# Patient Record
Sex: MALE | Race: WHITE | NOT HISPANIC OR LATINO | Employment: OTHER | URBAN - NONMETROPOLITAN AREA
[De-identification: names, ages, dates, MRNs, and addresses within clinical notes are randomized per-mention and may not be internally consistent; named-entity substitution may affect disease eponyms.]

---

## 2017-03-22 DIAGNOSIS — M25.512 LEFT SHOULDER PAIN: Primary | ICD-10-CM

## 2017-03-27 ENCOUNTER — HOSPITAL ENCOUNTER (OUTPATIENT)
Dept: MRI IMAGING | Facility: HOSPITAL | Age: 77
Discharge: HOME | End: 2017-03-27
Attending: FAMILY MEDICINE | Admitting: FAMILY MEDICINE
Payer: MEDICARE

## 2017-03-27 PROCEDURE — 73221 MRI JOINT UPR EXTREM W/O DYE: CPT | Mod: TC,LT

## 2017-11-09 ENCOUNTER — APPOINTMENT (OUTPATIENT)
Dept: ANESTHESIOLOGY | Facility: HOSPITAL | Age: 77
End: 2017-11-09
Attending: OPHTHALMOLOGY
Payer: MEDICARE

## 2017-11-09 PROCEDURE — 99100 ANES PT EXTEME AGE<1 YR&>70: CPT | Performed by: NURSE ANESTHETIST, CERTIFIED REGISTERED

## 2017-11-09 PROCEDURE — 00142 ANES PX ON EYE LENS SURGERY: CPT | Mod: QZ | Performed by: NURSE ANESTHETIST, CERTIFIED REGISTERED

## 2017-11-21 ENCOUNTER — APPOINTMENT (OUTPATIENT)
Dept: ANESTHESIOLOGY | Facility: HOSPITAL | Age: 77
End: 2017-11-21
Attending: OPHTHALMOLOGY
Payer: MEDICARE

## 2017-11-21 PROCEDURE — 99100 ANES PT EXTEME AGE<1 YR&>70: CPT | Performed by: NURSE ANESTHETIST, CERTIFIED REGISTERED

## 2017-11-21 PROCEDURE — 00142 ANES PX ON EYE LENS SURGERY: CPT | Mod: QZ | Performed by: NURSE ANESTHETIST, CERTIFIED REGISTERED

## 2018-12-20 ENCOUNTER — HOSPITAL ENCOUNTER (EMERGENCY)
Facility: HOSPITAL | Age: 78
Discharge: HOME | End: 2018-12-20
Attending: EMERGENCY MEDICINE | Admitting: EMERGENCY MEDICINE
Payer: MEDICARE

## 2018-12-20 VITALS
RESPIRATION RATE: 16 BRPM | WEIGHT: 225 LBS | HEIGHT: 73 IN | TEMPERATURE: 97.9 F | BODY MASS INDEX: 29.82 KG/M2 | DIASTOLIC BLOOD PRESSURE: 93 MMHG | HEART RATE: 86 BPM | OXYGEN SATURATION: 95 % | SYSTOLIC BLOOD PRESSURE: 159 MMHG

## 2018-12-20 DIAGNOSIS — H66.002 LEFT ACUTE SUPPURATIVE OTITIS MEDIA: Primary | ICD-10-CM

## 2018-12-20 PROCEDURE — 25000132 ZZH RX MED GY IP 250 OP 250 PS 637: Mod: GY | Performed by: EMERGENCY MEDICINE

## 2018-12-20 PROCEDURE — 99283 EMERGENCY DEPT VISIT LOW MDM: CPT | Mod: Z6 | Performed by: EMERGENCY MEDICINE

## 2018-12-20 PROCEDURE — A9270 NON-COVERED ITEM OR SERVICE: HCPCS | Mod: GY | Performed by: EMERGENCY MEDICINE

## 2018-12-20 PROCEDURE — 99283 EMERGENCY DEPT VISIT LOW MDM: CPT

## 2018-12-20 RX ORDER — IBUPROFEN 800 MG/1
800 TABLET, FILM COATED ORAL ONCE
Status: COMPLETED | OUTPATIENT
Start: 2018-12-20 | End: 2018-12-20

## 2018-12-20 RX ORDER — METOPROLOL SUCCINATE 50 MG/1
50 TABLET, EXTENDED RELEASE ORAL
COMMUNITY
End: 2023-01-27

## 2018-12-20 RX ORDER — IRBESARTAN AND HYDROCHLOROTHIAZIDE 300; 12.5 MG/1; MG/1
1 TABLET, FILM COATED ORAL
COMMUNITY
End: 2023-02-03

## 2018-12-20 RX ORDER — IBUPROFEN 800 MG/1
800 TABLET, FILM COATED ORAL EVERY 8 HOURS PRN
Qty: 60 TABLET | Refills: 0 | Status: SHIPPED | OUTPATIENT
Start: 2018-12-20 | End: 2018-12-28

## 2018-12-20 RX ORDER — TRAMADOL HYDROCHLORIDE 50 MG/1
50-100 TABLET ORAL
COMMUNITY
Start: 2016-09-19 | End: 2018-12-20

## 2018-12-20 RX ORDER — EZETIMIBE 10 MG/1
10 TABLET ORAL EVERY MORNING
COMMUNITY

## 2018-12-20 RX ORDER — ASPIRIN 81 MG/1
81 TABLET ORAL EVERY MORNING
COMMUNITY

## 2018-12-20 RX ORDER — GABAPENTIN 100 MG/1
100 CAPSULE ORAL AT BEDTIME
COMMUNITY
End: 2023-02-03

## 2018-12-20 RX ORDER — AMLODIPINE BESYLATE 5 MG/1
5 TABLET ORAL
COMMUNITY
End: 2023-01-27

## 2018-12-20 RX ORDER — DUTASTERIDE 0.5 MG/1
0.5 CAPSULE, LIQUID FILLED ORAL EVERY MORNING
COMMUNITY

## 2018-12-20 RX ORDER — METHOCARBAMOL 750 MG/1
750 TABLET, FILM COATED ORAL
COMMUNITY
Start: 2015-12-11 | End: 2018-12-20

## 2018-12-20 RX ORDER — SIMVASTATIN 20 MG
20 TABLET ORAL
COMMUNITY
Start: 2011-03-23

## 2018-12-20 RX ADMIN — IBUPROFEN 800 MG: 800 TABLET ORAL at 03:58

## 2018-12-20 RX ADMIN — AMOXICILLIN AND CLAVULANATE POTASSIUM 1 TABLET: 875; 125 TABLET, FILM COATED ORAL at 03:58

## 2018-12-20 SDOH — HEALTH STABILITY: MENTAL HEALTH: HOW OFTEN DO YOU HAVE A DRINK CONTAINING ALCOHOL?: NEVER

## 2018-12-20 ASSESSMENT — ENCOUNTER SYMPTOMS
FEVER: 0
ABDOMINAL PAIN: 0
SHORTNESS OF BREATH: 0

## 2018-12-20 ASSESSMENT — MIFFLIN-ST. JEOR: SCORE: 1789.47

## 2018-12-20 NOTE — ED AVS SNAPSHOT
[facility]  [address] [address]  Phone:  [phone]                                    Ramón Salazar   MRN: [id number]    Department:  [facility]   Date of Visit:  12/20/2018           After Visit Summary Signature Page    I have received my discharge instructions, and my questions have been answered. I have discussed any challenges I see with this plan with the nurse or doctor.    ..........................................................................................................................................  Patient/Patient Representative Signature      ..........................................................................................................................................  Patient Representative Print Name and Relationship to Patient    ..................................................               ................................................  Date                                   Time    ..........................................................................................................................................  Reviewed by Signature/Title    ...................................................              ..............................................  Date                                               Time          22EPIC Rev 08/18

## 2018-12-20 NOTE — ED PROVIDER NOTES
"  History     Chief Complaint   Patient presents with     URI     Otalgia     Pharyngitis     HPI  Ramón Salazar is a 78 year old male who presents to the emergency department with a 2-day history of sore throat, left ear pain, nasal congestion.  He started having discharge from the left ear since last night.  The pain has become more intense tonight.  Reports to having recurrent problems with the left ear.    Problem List:    There are no active problems to display for this patient.       Past Medical History:    History reviewed. No pertinent past medical history.    Past Surgical History:    History reviewed. No pertinent surgical history.    Family History:    History reviewed. No pertinent family history.    Social History:  Marital Status:   [2]  Social History     Tobacco Use     Smoking status: Never Smoker     Smokeless tobacco: Former User   Substance Use Topics     Alcohol use: No     Frequency: Never     Drug use: No        Medications:      amLODIPine (NORVASC) 5 MG tablet   amoxicillin-clavulanate (AUGMENTIN) 875-125 MG tablet   aspirin 81 MG EC tablet   dutasteride (AVODART) 0.5 MG capsule   ezetimibe (ZETIA) 10 MG tablet   gabapentin (NEURONTIN) 100 MG capsule   Garlic 1000 MG CAPS   ibuprofen (ADVIL/MOTRIN) 800 MG tablet   irbesartan-hydrochlorothiazide (AVALIDE) 300-12.5 MG tablet   MAGNESIUM CITRATE PO   metFORMIN (GLUCOPHAGE) 1000 MG tablet   metoprolol succinate ER (TOPROL-XL) 50 MG 24 hr tablet   simvastatin (ZOCOR) 20 MG tablet         Review of Systems   Constitutional: Negative for fever.   HENT: Positive for congestion, ear discharge and ear pain.    Respiratory: Negative for shortness of breath.    Cardiovascular: Negative for chest pain.   Gastrointestinal: Negative for abdominal pain.   All other systems reviewed and are negative.      Physical Exam   BP: 159/93  Pulse: 86  Temp: 97.9  F (36.6  C)  Resp: 16  Height: 185.4 cm (6' 1\")  Weight: 102.1 kg (225 lb)  SpO2: 95 " %      Physical Exam   Constitutional: He is oriented to person, place, and time. He appears well-developed and well-nourished. No distress.   HENT:   Head: Normocephalic and atraumatic.   Mouth/Throat: Oropharynx is clear and moist.   Separative left otitis media.   Eyes: EOM are normal. Pupils are equal, round, and reactive to light.   Cardiovascular: Normal rate, regular rhythm, normal heart sounds and intact distal pulses.   Pulmonary/Chest: Effort normal and breath sounds normal. No stridor. No respiratory distress.   Abdominal: Bowel sounds are normal. He exhibits no distension. There is no tenderness.   Musculoskeletal: He exhibits no edema, tenderness or deformity.   Neurological: He is alert and oriented to person, place, and time. No cranial nerve deficit.   Skin: He is not diaphoretic.   Nursing note and vitals reviewed.      ED Course        Procedures         No results found for this or any previous visit (from the past 24 hour(s)).    Medications   amoxicillin-clavulanate (AUGMENTIN) 875-125 MG per tablet 1 tablet (1 tablet Oral Given 12/20/18 0358)   ibuprofen (ADVIL/MOTRIN) tablet 800 mg (800 mg Oral Given 12/20/18 0358)       Assessments & Plan (with Medical Decision Making)   Left acute suppurative otitis media: Patient started on Augmentin on Motrin and was discharged home on the same.  Advised follow-up with PCP after completion of antibiotics.  Subsequently discharged from the ED.    I have reviewed the nursing notes.    I have reviewed the findings, diagnosis, plan and need for follow up with the patient.       Medication List      Started    amoxicillin-clavulanate 875-125 MG tablet  Commonly known as:  AUGMENTIN  1 tablet, Oral, 2 TIMES DAILY     ibuprofen 800 MG tablet  Commonly known as:  ADVIL/MOTRIN  800 mg, Oral, EVERY 8 HOURS PRN            Final diagnoses:   Left acute suppurative otitis media       12/20/2018   [facility]     Mike Randolph MD  12/20/18 8196        Mike Randolph MD  12/20/18 5255

## 2021-05-18 ENCOUNTER — OFFICE VISIT (OUTPATIENT)
Dept: OTOLARYNGOLOGY | Facility: OTHER | Age: 81
End: 2021-05-18
Attending: OTOLARYNGOLOGY
Payer: MEDICARE

## 2021-05-18 DIAGNOSIS — H90.A32 MIXED CONDUCTIVE AND SENSORINEURAL HEARING LOSS OF LEFT EAR WITH RESTRICTED HEARING OF RIGHT EAR: Primary | ICD-10-CM

## 2021-05-18 PROCEDURE — G0463 HOSPITAL OUTPT CLINIC VISIT: HCPCS

## 2021-05-18 NOTE — PROGRESS NOTES
document embedded image  Patient Name: Ramón Salazar    Address: [address] [place] [address]     YOB: 1940    [place] [address]    MR Number: [id number]    Phone: [phone]  PCP: Dwight Bliss DO            Appointment Date: 05/18/21   Visit Provider: Curry Tamayo MD    cc: Dwight Bliss DO; ~    ENT Progress Note  Visit Reasons: Left ear/decreased Hearing    HPI  History of Present Illness  Chief complaint:  Hearing loss    History  The patient is an 80-year-old man whose formally been a patient of Dr. Guidry.  He has a tube in his left ear.  He has had some intermittent drainage but denies any at present.  He feels his hearing loss has worsened over the years and is dramatically worsened in his left ear.    Exam  The external auditory canal and TM are clear on the right.  On the left there is a tube in place and appears to be some moist drainage present.  Remainder of the head neck exam is unremarkable  Audiogram-sloping sensorineural hearing loss in the right ear.  He has some additional conductive hearing loss in the left.  His speech reception thresholds 30 on the right and 40 on the left.    Home Medications     - Last Reconciled 05/19/21 by Theodore Tubbs    allopurinol 100 mg tablet  100 mg PO DAILY  ascorbic acid (vitamin C) 1,000 mg tablet  1 g PO DAILY  aspirin 81 mg tablet,delayed release (Adult Aspirin Regimen)  81 mg PO DAILY  blood sugar diagnostic (Accu-Chek Lenka Plus test strp)  TEST TWICE DAILY  blood-glucose meter   As directed  cholecalciferol (vitamin D3) 25 mcg (1,000 unit) capsule  25 mcg PO DAILY  dulaglutide 0.75 mg/0.5 mL subcutaneous pen injector (Trulicity)  0.75 mg (0.5 mL) SUBCUT QWEEK  dutasteride 0.5 mg capsule  0.5 mg PO DAILY  echinacea   PO  ezetimibe 10 mg tablet  10 mg PO DAILY  flash glucose scanning reader   As directed  flash glucose sensor   As directed  fluticasone propionate 50 mcg/actuation nasal spray,suspension  2  sprays intranasal DAILY  gabapentin 100 mg capsule  100 mg PO BEDTIME  garlic   1,000 mg PO QPC  glucos sul 2KCl-msm-chond-C-Mn (Glucosamine Chondroitin)  PO  irbesartan 300 mg tablet  300 mg PO DAILY  magnesium 250 mg tablet  250 mg PO BID  metformin 1,000 mg tablet  TAKE 1 TABLET BY MOUTH TWICE DAILY  multivitamin   1 tab PO DAILY  simvastatin 20 mg tablet  20 mg PO BEDTIME  zinc sulfate 50 mg zinc (220 mg) tablet  50 mg PO DAILY    Allergies    morphine Adverse Reaction (Verified 05/19/21 10:58)  Anxiety    PFSH  PFSH:     Medical History (Reviewed 05/19/21 @ 10:58 by Shelli Bear, Med Assist)    Conductive hearing loss of left ear with unrestricted hearing of right ear (05/29/18)  Controlled diabetes mellitus type II without complication (06/25/18)  Controlled type 2 diabetes mellitus  Enlarged prostate without lower urinary tract symptoms (luts) (03/22/16)  Erectile dysfunction (06/25/18)  Essential (primary) hypertension (06/25/18)  Heart stopped beating  Aborted back surgery and needed temporary pacemaker  Mixed hearing loss (05/08/17)  Mixed hyperlipidemia (06/25/18)  Osteoarthritis, knee (03/22/17)  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 small polyp. Recheck in 2021.  History of back surgery  Dec 2011  History of neck surgery  March 2011 spinal stenosis  History of shoulder surgery  June 2003 Right shoulder rotator cuff repair  History of surgery of head  Trauma to forehead, repaired Aug. 1969  Total knee replacement status (03/22/17)     Family History (Reviewed 05/19/21 @ 10:58 by Shelli Bear, Med Assist)  Father  Cancer  Emphysema of lung  Mother  Lung cancer  Sister  Breast cancer  Other  Bone cancer  Diabetes mellitus  Heart disease  Hypertension       Social History (Reviewed 05/19/21 @ 10:58 by Shelli Bear, Med  Assist)  Smoking Status:  Never smoker  second hand exposure:  No (past)  alcohol intake:  current alcohol intake frequency: holidays/special occasions only Alcohol type: beer, wine, hard liquor and other  substance use type:  does not use  marital status:    lives independently:  Yes  number of children:  4  housing:  house  Do you exercise regularly?:  No  caffeine:  Yes Type: coffee Number of servings: 1       A&P  Assessment & Plan  (1) Mixed hearing loss of left ear:        Status: Acute        Code(s):  H90.72 - Mixed conductive and sensorineural hearing loss, unilateral, left ear, with unrestricted hearing on the contralateral side        Qualifiers:          Contralateral hearing status: restricted hearing on contralateral side  Qualified Code(s): H90.A32 - Mixed conductive and sensorineural hearing loss, unilateral, left ear with restricted hearing on the contralateral side  Additional Plan Details  Plan Details: I would not recommend hearing aid fitting on the left ear given his recurring drainage and persistent need for a tube.  I think he is safe to proceed with a hearing aid on the right if he wishes.  He should continue to see me yearly for T-tube checks on the left.  He is welcome to call if he develops drainage in the future.      Curry Tamayo MD    05/18/21 7639    <Electronically signed by Curry Tamayo MD> 05/19/21 8145

## 2022-03-01 ENCOUNTER — MEDICAL CORRESPONDENCE (OUTPATIENT)
Dept: CT IMAGING | Facility: HOSPITAL | Age: 82
End: 2022-03-01
Payer: COMMERCIAL

## 2022-03-22 ENCOUNTER — HOSPITAL ENCOUNTER (OUTPATIENT)
Facility: HOSPITAL | Age: 82
Discharge: HOME | End: 2022-03-22
Attending: RADIOLOGY | Admitting: RADIOLOGY
Payer: MEDICARE

## 2022-03-22 ENCOUNTER — HOSPITAL ENCOUNTER (OUTPATIENT)
Dept: INTERVENTIONAL RADIOLOGY/VASCULAR | Facility: HOSPITAL | Age: 82
Discharge: HOME | End: 2022-03-22
Attending: ORTHOPAEDIC SURGERY | Admitting: RADIOLOGY
Payer: MEDICARE

## 2022-03-22 ENCOUNTER — HOSPITAL ENCOUNTER (OUTPATIENT)
Dept: CT IMAGING | Facility: HOSPITAL | Age: 82
Discharge: HOME | End: 2022-03-22
Attending: ORTHOPAEDIC SURGERY | Admitting: RADIOLOGY
Payer: MEDICARE

## 2022-03-22 DIAGNOSIS — M25.612 DECREASED ROM OF LEFT SHOULDER: ICD-10-CM

## 2022-03-22 DIAGNOSIS — R29.898 ARM WEAKNESS: ICD-10-CM

## 2022-03-22 DIAGNOSIS — M25.512 LEFT SHOULDER PAIN: ICD-10-CM

## 2022-03-22 PROCEDURE — 250N000009 HC RX 250: Performed by: RADIOLOGY

## 2022-03-22 PROCEDURE — 255N000002 HC RX 255 OP 636: Performed by: RADIOLOGY

## 2022-03-22 PROCEDURE — 23350 INJECTION FOR SHOULDER X-RAY: CPT

## 2022-03-22 PROCEDURE — 73201 CT UPPER EXTREMITY W/DYE: CPT | Mod: LT

## 2022-03-22 RX ORDER — LIDOCAINE HYDROCHLORIDE 10 MG/ML
10 INJECTION, SOLUTION EPIDURAL; INFILTRATION; INTRACAUDAL; PERINEURAL ONCE
Status: COMPLETED | OUTPATIENT
Start: 2022-03-22 | End: 2022-03-22

## 2022-03-22 RX ORDER — IOPAMIDOL 612 MG/ML
50 INJECTION, SOLUTION INTRAVASCULAR ONCE
Status: COMPLETED | OUTPATIENT
Start: 2022-03-22 | End: 2022-03-22

## 2022-03-22 RX ADMIN — LIDOCAINE HYDROCHLORIDE 5 ML: 10 INJECTION, SOLUTION EPIDURAL; INFILTRATION; INTRACAUDAL; PERINEURAL at 10:25

## 2022-03-22 RX ADMIN — IOPAMIDOL 8 ML: 612 INJECTION, SOLUTION INTRAVENOUS at 10:26

## 2022-03-23 ENCOUNTER — TELEPHONE (OUTPATIENT)
Dept: MAMMOGRAPHY | Facility: OTHER | Age: 82
End: 2022-03-23

## 2022-12-29 ENCOUNTER — PREP FOR PROCEDURE (OUTPATIENT)
Dept: SURGERY | Facility: CLINIC | Age: 82
End: 2022-12-29

## 2022-12-29 DIAGNOSIS — M75.102 TEAR OF LEFT ROTATOR CUFF, UNSPECIFIED TEAR EXTENT, UNSPECIFIED WHETHER TRAUMATIC: Primary | ICD-10-CM

## 2022-12-29 RX ORDER — TRANEXAMIC ACID 10 MG/ML
1 INJECTION, SOLUTION INTRAVENOUS ONCE
Status: CANCELLED | OUTPATIENT
Start: 2023-02-06 | End: 2022-12-29

## 2023-01-24 ENCOUNTER — ANESTHESIA EVENT (OUTPATIENT)
Dept: SURGERY | Facility: HOSPITAL | Age: 83
DRG: 483 | End: 2023-01-24
Payer: MEDICARE

## 2023-01-27 NOTE — OR NURSING
email sent to total joint replacement team as follows;    We have Ramón Salazar : 38 coming  for Left revision total shoulder replacement conversion to left reverse total shoulder replacement with Dr. Miranda, PCP Dr. Bliss.  Ramón did receive the total joint replacement education packet for shoulder and will perform the two showers with hibiclens soap provided as instructed.  He is right hand dominant and his wife will be helping him.  He is aware of total shoulder replacement recovery.  He plans on going home the following morning from surgery.    Reviewed the following topics with Ramón;  Call don t Fall , Visitor/mask policy, Incentive spirometer, Using ice, Possible constipation with pain medication use, Wound care and signs & symptoms of infection to watch for, report and prevent.  He verbalized good understanding.

## 2023-01-30 ENCOUNTER — TRANSFERRED RECORDS (OUTPATIENT)
Dept: HEALTH INFORMATION MANAGEMENT | Facility: CLINIC | Age: 83
End: 2023-01-30
Payer: COMMERCIAL

## 2023-01-30 LAB
ALT SERPL-CCNC: 23 U/L (ref 18–65)
AST SERPL-CCNC: 22 U/L (ref 10–40)
CREATININE (EXTERNAL): 0.92 MG/DL (ref 0.8–1.5)
GFR ESTIMATED (EXTERNAL): >60 ML/MIN/1.73M2
GLUCOSE (EXTERNAL): 173 MG/DL (ref 60–99)
POTASSIUM (EXTERNAL): 4.3 MMOL/L (ref 3.5–5.1)

## 2023-01-30 RX ORDER — LABETALOL 20 MG/4 ML (5 MG/ML) INTRAVENOUS SYRINGE
10
Status: DISCONTINUED | OUTPATIENT
Start: 2023-01-30 | End: 2023-01-30

## 2023-01-30 ASSESSMENT — ENCOUNTER SYMPTOMS: DYSRHYTHMIAS: 1

## 2023-01-30 NOTE — ANESTHESIA PREPROCEDURE EVALUATION
Anesthesia Pre-Procedure Evaluation    Patient: Ramón Salazar   MRN: [id number] : 1940        Procedure : Procedure(s):  Left Revision Total Shoulder Arthroplasty; Conversion to Reverse Total Shoulder Arthroplasty          No past medical history on file.   History reviewed. No pertinent surgical history.   No Known Allergies   Social History     Tobacco Use     Smoking status: Never     Smokeless tobacco: Former   Substance Use Topics     Alcohol use: No      Wt Readings from Last 1 Encounters:   18 102.1 kg (225 lb)        Anesthesia Evaluation   Pt has had prior anesthetic. Type: General.        ROS/MED HX  ENT/Pulmonary: Comment: Conductive hearing loss      Neurologic: Comment: spinal stenosis    Hx spine surgery: TLIF and T12-L1 laminectomy; had heart block during procedure requiring temporary pacemaker. Previous cervical spine surgery    Degenerative disc disease   Hearing loss  tinnitus    (+) peripheral neuropathy, - numb in fingers.     Cardiovascular: Comment: Erectile dysfunction    Aborted back surgery and needed temporary pacemaker in     Wenckebach block    (+) Dyslipidemia hypertension-----dysrhythmias, 2nd Deg Heart Block, Previous cardiac testing   Echo: Date: 21 Results:  LV normal in size  LV systolic function low normal  EF 50-55  RV systolic fx normal  Borderline ascending aorta dilation    Stress Test: Date: Results:    ECG Reviewed: Date: 23 Results:  SR with IRBBB  Cath: Date: Results:      METS/Exercise Tolerance: >4 METS    Hematologic:  - neg hematologic  ROS     Musculoskeletal: Comment:   S/p cervical fusion    (+) arthritis,     GI/Hepatic: Comment: Peptic ulcer disease    (+) GERD, Asymptomatic on medication,     Renal/Genitourinary:     (+) BPH,     Endo:     (+) type II DM, Last HgA1c: 6, Not using insulin, - not using insulin pump. Normal glucose range: 149 in preop, Obesity,     Psychiatric/Substance Use:       Infectious Disease:  - neg infectious  disease ROS     Malignancy:   (+) Malignancy, History of Skin.Skin CA Remission status post Surgery.        Other:  - neg other ROS          Physical Exam    Airway        Mallampati: III   TM distance: > 3 FB   Neck ROM: limited   Mouth opening: > 3 cm    Respiratory Devices and Support         Dental     Comment: Bridge on all front teeth    (+) Multiple crowns, permanant bridges      Cardiovascular   cardiovascular exam normal       Rhythm and rate: regular and normal     Pulmonary   pulmonary exam normal        breath sounds clear to auscultation           OUTSIDE LABS:  CBC: No results found for: WBC, HGB, HCT, PLT  BMP: No results found for: NA, POTASSIUM, CHLORIDE, CO2, BUN, CR, GLC  COAGS: No results found for: PTT, INR, FIBR  POC: No results found for: BGM, HCG, HCGS  HEPATIC: No results found for: ALBUMIN, PROTTOTAL, ALT, AST, GGT, ALKPHOS, BILITOTAL, BILIDIRECT, DARION  OTHER: No results found for: PH, LACT, A1C, FREDY, PHOS, MAG, LIPASE, AMYLASE, TSH, T4, T3, CRP, SED    Anesthesia Plan    ASA Status:  3      Anesthesia Type: General.     - Airway: ETT   Induction: Intravenous, Propofol.   Maintenance: Balanced.        Consents    Anesthesia Plan(s) and associated risks, benefits, and realistic alternatives discussed. Questions answered and patient/representative(s) expressed understanding.     - Discussed: Risks, Benefits and Alternatives for BOTH SEDATION and the PROCEDURE were discussed     - Discussed with:  Patient      - Extended Intubation/Ventilatory Support Discussed: No.      - Patient is DNR/DNI Status: No    Use of blood products discussed: No .     Postoperative Care    Pain management: Peripheral nerve block (Single Shot).        Comments:    Other Comments:  1/30/23            NORMA DODD CRNA

## 2023-02-03 RX ORDER — CEPHRADINE 500 MG
1 CAPSULE ORAL EVERY MORNING
COMMUNITY

## 2023-02-03 RX ORDER — MULTIVITAMIN,THERAPEUTIC
1 TABLET ORAL EVERY MORNING
COMMUNITY

## 2023-02-03 RX ORDER — KETOCONAZOLE 20 MG/G
CREAM TOPICAL 2 TIMES DAILY PRN
COMMUNITY

## 2023-02-03 RX ORDER — FLUTICASONE PROPIONATE 50 MCG
2 SPRAY, SUSPENSION (ML) NASAL DAILY PRN
COMMUNITY

## 2023-02-03 RX ORDER — ZINC SULFATE 50(220)MG
220 CAPSULE ORAL EVERY MORNING
COMMUNITY

## 2023-02-03 RX ORDER — YEAST,DRIED (S. CEREVISIAE)
1 POWDER (GRAM) ORAL EVERY MORNING
COMMUNITY

## 2023-02-03 RX ORDER — SELENIUM 200 MCG
1 TABLET ORAL EVERY MORNING
COMMUNITY

## 2023-02-03 RX ORDER — ACETAMINOPHEN 500 MG
500 TABLET ORAL 2 TIMES DAILY
COMMUNITY

## 2023-02-03 RX ORDER — IRBESARTAN 300 MG/1
300 TABLET ORAL EVERY MORNING
COMMUNITY

## 2023-02-03 RX ORDER — ALLOPURINOL 100 MG/1
100 TABLET ORAL EVERY MORNING
COMMUNITY

## 2023-02-03 RX ORDER — MULTIVITAMIN WITH IRON
1 TABLET ORAL EVERY MORNING
COMMUNITY

## 2023-02-03 RX ORDER — CETIRIZINE HYDROCHLORIDE 10 MG/1
10 TABLET ORAL DAILY PRN
COMMUNITY

## 2023-02-03 NOTE — CARE PLAN
Prior to Admission Medication Reconciliation preparation:   (medlist prepared and reviewed with patient prior to surgery)    Medications added:   [] None  [] Cleared entire med list from historical, outdated medications and re-entered current medications  [x] As listed below:    Allopurinol    Vitamin c    Magnesium     ketoconazole    avapro    flonase    Zyrtec    Vit d     megavite    apap- takes BID scheduled for pain control.     Medications deleted:   [] None  [] Cleared entire med list from historical, outdated medications   [x] As listed below:    avalide    Mag citrate    Gabapentin 100 mg- per pt prescribed for sleep, has not taken since last spring. Has not needed. No recent fills.      Changes made to existing medications:   [] None  [x] Updated time of day, strengths and frequencies to most current.     All    Ketoconazole- pt completed scheduled therapy and now has on hand to use PRN.     flonase- pt completed scheduled therapy and now has on hand and uses PRN.     Notes/pertinent information about medications:     Stopped for 1 week: asa, vit D, garlic      Holding metformin the morning of procedure      Will be taking avapro, only, the morning of surgery and can take everything else after.      Patient will be due for his trulicity injection after surgery. Pt will bring in home dose since it is non-formulary.     Allergy Review:  [x]Allergies reviewed and updated if necessary.       Medication reconciliation sources:   []Patient via phone conversation.   []Medlist from primary provider outside of [facility]:  [x] [facility] Report Review:  [x]Allopurinol 100 mg daily  [x]Ascorbic acid 1000 mg daily   [x]Asa 81 mg daily   [x]Cetirizine 10 mg daily PRN allergies  [x]Vitamin D3 1000 units daily   [x]dulaglutide 1.5 mg/0.5 mL subcutaneous pen injector 1.5 mg (0.5 mL) qweek  [x]Dutasteride 0.5 mg daily   [x]Echinacea  [x]Ezetimibe 10 mg daily   [x]flonase 50 mcg 2 sprays intranasal daily  [x]Gabapentin 100 mg at  bedtime   [x]Garlic 1000 mg daily   [x]Glucosamine chondroitin  [x]Irbesartan 300 mg daily   [x]Ketoconazole 2% topical crm BID   [x]Magnesium 250 mg BID  [x]Metformin 1000 mg BID  [x]Multivitamin daily   [x]Simvastatin 20 mg at bedtime   [x]Zinc sulfate 50 mg (220 mg) tab daily    Assessment & Plan  (1) Pre-operative general physical examination:         Code(s):  Z01.818 - Encounter for other preprocedural examination  I believe he is an appropriate surgical risk for this procedure.  I will have him hold his aspirin and his vitamin-D 5-7 days prior to the procedure.  I will have him hold his metformin the morning of the procedure.  All have him take his inhaler and his blood pressure med the morning of the procedure the rest of his medications he can take after the procedure that day.    []Epic Chart Review  []Care Everywhere review  [x]Pharmacy phone call: [facility]    Simvastatin 20 mg 1/25/23    trulicity 1.5 mg/0.5 ml pen 1/3/23    Metformin 1000 mg 12/31/22    Ezetimibe 10 mg 12/19/22    Allopurinol 100 mg 12/12/22    Simvastatin 20 mg 11/18/22    Irbesartan 300 mg 11/18/22    Dutasteride 0.5 mg 10/17/22  flonase 3/22/22  []Outside meds dispense report:   []Fill dates reflect compliancy. No concerns.  []Fill dates do not reflect compliancy on the following medications:   []Behavioral Health Provider:   []Nursing home or Assisted Living MAR:  []Other: **    Is patient on coumadin?  [x]No  []Yes:       Pharmacy patient regularly uses:  [facility]    Approximate time frame PTA medications will be reviewed with patient or managing party:    [x]PTA meds updated and reviewed with patient prior to admission. No concerns. Scribe will double check PTA meds upon admission, but will not re-review unless there is something that needs to be addressed.       Kelli Denis on 2/3/2023 at 9:23 AM

## 2023-02-06 ENCOUNTER — APPOINTMENT (OUTPATIENT)
Dept: GENERAL RADIOLOGY | Facility: HOSPITAL | Age: 83
DRG: 483 | End: 2023-02-06
Attending: PHYSICIAN ASSISTANT
Payer: MEDICARE

## 2023-02-06 ENCOUNTER — APPOINTMENT (OUTPATIENT)
Dept: ULTRASOUND IMAGING | Facility: HOSPITAL | Age: 83
DRG: 483 | End: 2023-02-06
Attending: NURSE ANESTHETIST, CERTIFIED REGISTERED
Payer: MEDICARE

## 2023-02-06 ENCOUNTER — HOSPITAL ENCOUNTER (INPATIENT)
Facility: HOSPITAL | Age: 83
LOS: 1 days | Discharge: HOME | DRG: 483 | End: 2023-02-07
Attending: ORTHOPAEDIC SURGERY | Admitting: ORTHOPAEDIC SURGERY
Payer: MEDICARE

## 2023-02-06 ENCOUNTER — APPOINTMENT (OUTPATIENT)
Dept: GENERAL RADIOLOGY | Facility: HOSPITAL | Age: 83
DRG: 483 | End: 2023-02-06
Attending: INTERNAL MEDICINE
Payer: MEDICARE

## 2023-02-06 ENCOUNTER — ANESTHESIA (OUTPATIENT)
Dept: SURGERY | Facility: HOSPITAL | Age: 83
DRG: 483 | End: 2023-02-06
Payer: MEDICARE

## 2023-02-06 DIAGNOSIS — Z96.612 STATUS POST REVERSE ARTHROPLASTY OF LEFT SHOULDER: Primary | ICD-10-CM

## 2023-02-06 LAB
GLUCOSE BLDC GLUCOMTR-MCNC: 123 MG/DL (ref 70–99)
GLUCOSE BLDC GLUCOMTR-MCNC: 149 MG/DL (ref 70–99)
GRAM STAIN RESULT: ABNORMAL
GRAM STAIN RESULT: NORMAL
GRAM STAIN RESULT: NORMAL

## 2023-02-06 PROCEDURE — 23474 REVIS RECONST SHOULDER JOINT: CPT | Mod: LT | Performed by: ORTHOPAEDIC SURGERY

## 2023-02-06 PROCEDURE — 71045 X-RAY EXAM CHEST 1 VIEW: CPT

## 2023-02-06 PROCEDURE — 250N000013 HC RX MED GY IP 250 OP 250 PS 637: Performed by: PHYSICIAN ASSISTANT

## 2023-02-06 PROCEDURE — 999N000065 XR SHOULDER LEFT PORT G/E 2 VIEWS: Mod: LT

## 2023-02-06 PROCEDURE — 710N000010 HC RECOVERY PHASE 1, LEVEL 2, PER MIN: Performed by: ORTHOPAEDIC SURGERY

## 2023-02-06 PROCEDURE — 370N000017 HC ANESTHESIA TECHNICAL FEE, PER MIN: Performed by: ORTHOPAEDIC SURGERY

## 2023-02-06 PROCEDURE — 250N000011 HC RX IP 250 OP 636: Performed by: PHYSICIAN ASSISTANT

## 2023-02-06 PROCEDURE — 360N000077 HC SURGERY LEVEL 4, PER MIN: Performed by: ORTHOPAEDIC SURGERY

## 2023-02-06 PROCEDURE — 23474 REVIS RECONST SHOULDER JOINT: CPT | Performed by: NURSE ANESTHETIST, CERTIFIED REGISTERED

## 2023-02-06 PROCEDURE — 258N000003 HC RX IP 258 OP 636: Performed by: NURSE ANESTHETIST, CERTIFIED REGISTERED

## 2023-02-06 PROCEDURE — 87102 FUNGUS ISOLATION CULTURE: CPT | Performed by: ORTHOPAEDIC SURGERY

## 2023-02-06 PROCEDURE — 64415 NJX AA&/STRD BRCH PLXS IMG: CPT | Mod: XU | Performed by: NURSE ANESTHETIST, CERTIFIED REGISTERED

## 2023-02-06 PROCEDURE — C1713 ANCHOR/SCREW BN/BN,TIS/BN: HCPCS | Performed by: ORTHOPAEDIC SURGERY

## 2023-02-06 PROCEDURE — 999N000141 HC STATISTIC PRE-PROCEDURE NURSING ASSESSMENT: Performed by: ORTHOPAEDIC SURGERY

## 2023-02-06 PROCEDURE — 23474 REVIS RECONST SHOULDER JOINT: CPT | Mod: AS | Performed by: PHYSICIAN ASSISTANT

## 2023-02-06 PROCEDURE — 250N000011 HC RX IP 250 OP 636: Performed by: NURSE ANESTHETIST, CERTIFIED REGISTERED

## 2023-02-06 PROCEDURE — C9290 INJ, BUPIVACAINE LIPOSOME: HCPCS | Performed by: ORTHOPAEDIC SURGERY

## 2023-02-06 PROCEDURE — 250N000009 HC RX 250: Performed by: ORTHOPAEDIC SURGERY

## 2023-02-06 PROCEDURE — 250N000011 HC RX IP 250 OP 636: Performed by: ORTHOPAEDIC SURGERY

## 2023-02-06 PROCEDURE — 250N000009 HC RX 250

## 2023-02-06 PROCEDURE — 271N000001 HC OR GENERAL SUPPLY NON-STERILE: Performed by: ORTHOPAEDIC SURGERY

## 2023-02-06 PROCEDURE — 99100 ANES PT EXTEME AGE<1 YR&>70: CPT | Performed by: NURSE ANESTHETIST, CERTIFIED REGISTERED

## 2023-02-06 PROCEDURE — 87205 SMEAR GRAM STAIN: CPT | Performed by: ORTHOPAEDIC SURGERY

## 2023-02-06 PROCEDURE — 87075 CULTR BACTERIA EXCEPT BLOOD: CPT | Performed by: ORTHOPAEDIC SURGERY

## 2023-02-06 PROCEDURE — 87077 CULTURE AEROBIC IDENTIFY: CPT | Performed by: ORTHOPAEDIC SURGERY

## 2023-02-06 PROCEDURE — 250N000025 HC SEVOFLURANE, PER MIN: Performed by: ORTHOPAEDIC SURGERY

## 2023-02-06 PROCEDURE — 99221 1ST HOSP IP/OBS SF/LOW 40: CPT | Mod: 24 | Performed by: INTERNAL MEDICINE

## 2023-02-06 PROCEDURE — 250N000011 HC RX IP 250 OP 636

## 2023-02-06 PROCEDURE — C1776 JOINT DEVICE (IMPLANTABLE): HCPCS | Performed by: ORTHOPAEDIC SURGERY

## 2023-02-06 PROCEDURE — 250N000009 HC RX 250: Performed by: NURSE ANESTHETIST, CERTIFIED REGISTERED

## 2023-02-06 PROCEDURE — 258N000003 HC RX IP 258 OP 636: Performed by: PHYSICIAN ASSISTANT

## 2023-02-06 PROCEDURE — 272N000001 HC OR GENERAL SUPPLY STERILE: Performed by: ORTHOPAEDIC SURGERY

## 2023-02-06 PROCEDURE — 999N000157 HC STATISTIC RCP TIME EA 10 MIN

## 2023-02-06 PROCEDURE — 120N000001 HC R&B MED SURG/OB

## 2023-02-06 DEVICE — IMPLANTABLE DEVICE: Type: IMPLANTABLE DEVICE | Site: SHOULDER | Status: FUNCTIONAL

## 2023-02-06 DEVICE — BASEPLATE-RSP 30MM W/P2 COATING: Type: IMPLANTABLE DEVICE | Site: SHOULDER | Status: FUNCTIONAL

## 2023-02-06 RX ORDER — LIDOCAINE 40 MG/G
CREAM TOPICAL
Status: DISCONTINUED | OUTPATIENT
Start: 2023-02-06 | End: 2023-02-06 | Stop reason: HOSPADM

## 2023-02-06 RX ORDER — BUPIVACAINE HYDROCHLORIDE 2.5 MG/ML
INJECTION, SOLUTION EPIDURAL; INFILTRATION; INTRACAUDAL
Status: COMPLETED | OUTPATIENT
Start: 2023-02-06 | End: 2023-02-06

## 2023-02-06 RX ORDER — ACETAMINOPHEN 325 MG/1
975 TABLET ORAL EVERY 8 HOURS
Status: DISCONTINUED | OUTPATIENT
Start: 2023-02-06 | End: 2023-02-07 | Stop reason: HOSPADM

## 2023-02-06 RX ORDER — NALOXONE HYDROCHLORIDE 0.4 MG/ML
0.2 INJECTION, SOLUTION INTRAMUSCULAR; INTRAVENOUS; SUBCUTANEOUS
Status: DISCONTINUED | OUTPATIENT
Start: 2023-02-06 | End: 2023-02-07 | Stop reason: HOSPADM

## 2023-02-06 RX ORDER — CEFAZOLIN SODIUM/WATER 2 G/20 ML
2 SYRINGE (ML) INTRAVENOUS SEE ADMIN INSTRUCTIONS
Status: DISCONTINUED | OUTPATIENT
Start: 2023-02-06 | End: 2023-02-06 | Stop reason: HOSPADM

## 2023-02-06 RX ORDER — TRANEXAMIC ACID 10 MG/ML
1 INJECTION, SOLUTION INTRAVENOUS ONCE
Status: COMPLETED | OUTPATIENT
Start: 2023-02-06 | End: 2023-02-06

## 2023-02-06 RX ORDER — SIMVASTATIN 20 MG
20 TABLET ORAL
Status: DISCONTINUED | OUTPATIENT
Start: 2023-02-06 | End: 2023-02-07 | Stop reason: HOSPADM

## 2023-02-06 RX ORDER — SODIUM CHLORIDE, SODIUM LACTATE, POTASSIUM CHLORIDE, CALCIUM CHLORIDE 600; 310; 30; 20 MG/100ML; MG/100ML; MG/100ML; MG/100ML
INJECTION, SOLUTION INTRAVENOUS CONTINUOUS
Status: DISCONTINUED | OUTPATIENT
Start: 2023-02-06 | End: 2023-02-06 | Stop reason: HOSPADM

## 2023-02-06 RX ORDER — SENNOSIDES 8.6 MG
1 TABLET ORAL DAILY PRN
COMMUNITY

## 2023-02-06 RX ORDER — NALOXONE HYDROCHLORIDE 0.4 MG/ML
0.2 INJECTION, SOLUTION INTRAMUSCULAR; INTRAVENOUS; SUBCUTANEOUS
Status: DISCONTINUED | OUTPATIENT
Start: 2023-02-06 | End: 2023-02-06

## 2023-02-06 RX ORDER — ONDANSETRON 2 MG/ML
4 INJECTION INTRAMUSCULAR; INTRAVENOUS EVERY 6 HOURS PRN
Status: DISCONTINUED | OUTPATIENT
Start: 2023-02-06 | End: 2023-02-07 | Stop reason: HOSPADM

## 2023-02-06 RX ORDER — LIDOCAINE 40 MG/G
CREAM TOPICAL
Status: DISCONTINUED | OUTPATIENT
Start: 2023-02-06 | End: 2023-02-07 | Stop reason: HOSPADM

## 2023-02-06 RX ORDER — HYDRALAZINE HYDROCHLORIDE 20 MG/ML
2.5-5 INJECTION INTRAMUSCULAR; INTRAVENOUS EVERY 10 MIN PRN
Status: DISCONTINUED | OUTPATIENT
Start: 2023-02-06 | End: 2023-02-06 | Stop reason: HOSPADM

## 2023-02-06 RX ORDER — FENTANYL CITRATE 50 UG/ML
25 INJECTION, SOLUTION INTRAMUSCULAR; INTRAVENOUS EVERY 5 MIN PRN
Status: DISCONTINUED | OUTPATIENT
Start: 2023-02-06 | End: 2023-02-06 | Stop reason: HOSPADM

## 2023-02-06 RX ORDER — NALOXONE HYDROCHLORIDE 0.4 MG/ML
0.4 INJECTION, SOLUTION INTRAMUSCULAR; INTRAVENOUS; SUBCUTANEOUS
Status: DISCONTINUED | OUTPATIENT
Start: 2023-02-06 | End: 2023-02-06

## 2023-02-06 RX ORDER — IRBESARTAN 150 MG/1
300 TABLET ORAL EVERY MORNING
Status: DISCONTINUED | OUTPATIENT
Start: 2023-02-07 | End: 2023-02-07 | Stop reason: HOSPADM

## 2023-02-06 RX ORDER — FLUTICASONE PROPIONATE 50 MCG
2 SPRAY, SUSPENSION (ML) NASAL DAILY PRN
Status: DISCONTINUED | OUTPATIENT
Start: 2023-02-06 | End: 2023-02-07 | Stop reason: HOSPADM

## 2023-02-06 RX ORDER — OXYCODONE HYDROCHLORIDE 5 MG/1
5 TABLET ORAL EVERY 4 HOURS PRN
Status: DISCONTINUED | OUTPATIENT
Start: 2023-02-06 | End: 2023-02-07 | Stop reason: HOSPADM

## 2023-02-06 RX ORDER — ASPIRIN 81 MG/1
81 TABLET ORAL EVERY MORNING
Status: DISCONTINUED | OUTPATIENT
Start: 2023-02-07 | End: 2023-02-07 | Stop reason: HOSPADM

## 2023-02-06 RX ORDER — HYDROMORPHONE HYDROCHLORIDE 1 MG/ML
0.4 INJECTION, SOLUTION INTRAMUSCULAR; INTRAVENOUS; SUBCUTANEOUS EVERY 5 MIN PRN
Status: DISCONTINUED | OUTPATIENT
Start: 2023-02-06 | End: 2023-02-06 | Stop reason: HOSPADM

## 2023-02-06 RX ORDER — OXYCODONE HYDROCHLORIDE 5 MG/1
5-10 TABLET ORAL EVERY 4 HOURS PRN
Qty: 30 TABLET | Refills: 0 | Status: SHIPPED | OUTPATIENT
Start: 2023-02-06

## 2023-02-06 RX ORDER — GLYCOPYRROLATE 0.2 MG/ML
INJECTION, SOLUTION INTRAMUSCULAR; INTRAVENOUS PRN
Status: DISCONTINUED | OUTPATIENT
Start: 2023-02-06 | End: 2023-02-06

## 2023-02-06 RX ORDER — DUTASTERIDE 0.5 MG/1
0.5 CAPSULE, LIQUID FILLED ORAL EVERY MORNING
Status: DISCONTINUED | OUTPATIENT
Start: 2023-02-06 | End: 2023-02-07 | Stop reason: HOSPADM

## 2023-02-06 RX ORDER — FENTANYL CITRATE 50 UG/ML
50 INJECTION, SOLUTION INTRAMUSCULAR; INTRAVENOUS EVERY 5 MIN PRN
Status: DISCONTINUED | OUTPATIENT
Start: 2023-02-06 | End: 2023-02-06 | Stop reason: HOSPADM

## 2023-02-06 RX ORDER — ONDANSETRON 4 MG/1
4 TABLET, ORALLY DISINTEGRATING ORAL EVERY 30 MIN PRN
Status: DISCONTINUED | OUTPATIENT
Start: 2023-02-06 | End: 2023-02-06 | Stop reason: HOSPADM

## 2023-02-06 RX ORDER — HYDROMORPHONE HCL IN WATER/PF 6 MG/30 ML
0.2 PATIENT CONTROLLED ANALGESIA SYRINGE INTRAVENOUS
Status: DISCONTINUED | OUTPATIENT
Start: 2023-02-06 | End: 2023-02-07 | Stop reason: HOSPADM

## 2023-02-06 RX ORDER — NALOXONE HYDROCHLORIDE 0.4 MG/ML
0.4 INJECTION, SOLUTION INTRAMUSCULAR; INTRAVENOUS; SUBCUTANEOUS
Status: DISCONTINUED | OUTPATIENT
Start: 2023-02-06 | End: 2023-02-07 | Stop reason: HOSPADM

## 2023-02-06 RX ORDER — PROCHLORPERAZINE MALEATE 5 MG
5 TABLET ORAL EVERY 6 HOURS PRN
Status: DISCONTINUED | OUTPATIENT
Start: 2023-02-06 | End: 2023-02-07 | Stop reason: HOSPADM

## 2023-02-06 RX ORDER — CEFAZOLIN SODIUM/WATER 2 G/20 ML
2 SYRINGE (ML) INTRAVENOUS
Status: COMPLETED | OUTPATIENT
Start: 2023-02-06 | End: 2023-02-06

## 2023-02-06 RX ORDER — AMOXICILLIN 250 MG
1 CAPSULE ORAL 2 TIMES DAILY
Status: DISCONTINUED | OUTPATIENT
Start: 2023-02-06 | End: 2023-02-07 | Stop reason: HOSPADM

## 2023-02-06 RX ORDER — ALLOPURINOL 100 MG/1
100 TABLET ORAL EVERY MORNING
Status: DISCONTINUED | OUTPATIENT
Start: 2023-02-06 | End: 2023-02-07 | Stop reason: HOSPADM

## 2023-02-06 RX ORDER — FENTANYL CITRATE 50 UG/ML
INJECTION, SOLUTION INTRAMUSCULAR; INTRAVENOUS PRN
Status: DISCONTINUED | OUTPATIENT
Start: 2023-02-06 | End: 2023-02-06

## 2023-02-06 RX ORDER — EZETIMIBE 10 MG/1
10 TABLET ORAL EVERY MORNING
Status: DISCONTINUED | OUTPATIENT
Start: 2023-02-07 | End: 2023-02-07 | Stop reason: HOSPADM

## 2023-02-06 RX ORDER — BUPIVACAINE HYDROCHLORIDE AND EPINEPHRINE 2.5; 5 MG/ML; UG/ML
INJECTION, SOLUTION INFILTRATION; PERINEURAL PRN
Status: DISCONTINUED | OUTPATIENT
Start: 2023-02-06 | End: 2023-02-06 | Stop reason: HOSPADM

## 2023-02-06 RX ORDER — MULTIVIT WITH MINERALS/LUTEIN
1000 TABLET ORAL EVERY MORNING
Status: DISCONTINUED | OUTPATIENT
Start: 2023-02-07 | End: 2023-02-07 | Stop reason: HOSPADM

## 2023-02-06 RX ORDER — SODIUM CHLORIDE 9 MG/ML
75 INJECTION, SOLUTION INTRAVENOUS CONTINUOUS
Status: DISCONTINUED | OUTPATIENT
Start: 2023-02-06 | End: 2023-02-07 | Stop reason: HOSPADM

## 2023-02-06 RX ORDER — LIDOCAINE HYDROCHLORIDE 20 MG/ML
INJECTION, SOLUTION INFILTRATION; PERINEURAL PRN
Status: DISCONTINUED | OUTPATIENT
Start: 2023-02-06 | End: 2023-02-06

## 2023-02-06 RX ORDER — BUPIVACAINE HYDROCHLORIDE AND EPINEPHRINE 2.5; 5 MG/ML; UG/ML
INJECTION, SOLUTION EPIDURAL; INFILTRATION; INTRACAUDAL; PERINEURAL
Status: DISCONTINUED
Start: 2023-02-06 | End: 2023-02-06 | Stop reason: HOSPADM

## 2023-02-06 RX ORDER — HYDROMORPHONE HYDROCHLORIDE 1 MG/ML
0.2 INJECTION, SOLUTION INTRAMUSCULAR; INTRAVENOUS; SUBCUTANEOUS EVERY 5 MIN PRN
Status: DISCONTINUED | OUTPATIENT
Start: 2023-02-06 | End: 2023-02-06 | Stop reason: HOSPADM

## 2023-02-06 RX ORDER — KETOCONAZOLE 20 MG/G
CREAM TOPICAL 2 TIMES DAILY PRN
Status: DISCONTINUED | OUTPATIENT
Start: 2023-02-06 | End: 2023-02-07 | Stop reason: HOSPADM

## 2023-02-06 RX ORDER — CEPHRADINE 500 MG
1 CAPSULE ORAL EVERY MORNING
Status: DISCONTINUED | OUTPATIENT
Start: 2023-02-07 | End: 2023-02-06

## 2023-02-06 RX ORDER — POLYETHYLENE GLYCOL 3350 17 G/17G
17 POWDER, FOR SOLUTION ORAL DAILY
Status: DISCONTINUED | OUTPATIENT
Start: 2023-02-07 | End: 2023-02-07 | Stop reason: HOSPADM

## 2023-02-06 RX ORDER — BISACODYL 10 MG
10 SUPPOSITORY, RECTAL RECTAL DAILY PRN
Status: DISCONTINUED | OUTPATIENT
Start: 2023-02-06 | End: 2023-02-07 | Stop reason: HOSPADM

## 2023-02-06 RX ORDER — PROPOFOL 10 MG/ML
INJECTION, EMULSION INTRAVENOUS PRN
Status: DISCONTINUED | OUTPATIENT
Start: 2023-02-06 | End: 2023-02-06

## 2023-02-06 RX ORDER — ACETAMINOPHEN 325 MG/1
650 TABLET ORAL EVERY 4 HOURS PRN
Status: DISCONTINUED | OUTPATIENT
Start: 2023-02-09 | End: 2023-02-07 | Stop reason: HOSPADM

## 2023-02-06 RX ORDER — EPHEDRINE SULFATE 50 MG/ML
INJECTION, SOLUTION INTRAMUSCULAR; INTRAVENOUS; SUBCUTANEOUS PRN
Status: DISCONTINUED | OUTPATIENT
Start: 2023-02-06 | End: 2023-02-06

## 2023-02-06 RX ORDER — CEFAZOLIN SODIUM 2 G/100ML
2 INJECTION, SOLUTION INTRAVENOUS EVERY 8 HOURS
Status: COMPLETED | OUTPATIENT
Start: 2023-02-06 | End: 2023-02-07

## 2023-02-06 RX ORDER — HYDROMORPHONE HCL IN WATER/PF 6 MG/30 ML
0.4 PATIENT CONTROLLED ANALGESIA SYRINGE INTRAVENOUS
Status: DISCONTINUED | OUTPATIENT
Start: 2023-02-06 | End: 2023-02-07 | Stop reason: HOSPADM

## 2023-02-06 RX ORDER — POLYETHYLENE GLYCOL 3350 17 G/17G
1 POWDER, FOR SOLUTION ORAL DAILY
COMMUNITY

## 2023-02-06 RX ORDER — ONDANSETRON 2 MG/ML
4 INJECTION INTRAMUSCULAR; INTRAVENOUS EVERY 30 MIN PRN
Status: DISCONTINUED | OUTPATIENT
Start: 2023-02-06 | End: 2023-02-06 | Stop reason: HOSPADM

## 2023-02-06 RX ORDER — ZINC SULFATE 50(220)MG
220 CAPSULE ORAL EVERY MORNING
Status: DISCONTINUED | OUTPATIENT
Start: 2023-02-07 | End: 2023-02-06

## 2023-02-06 RX ORDER — ALBUTEROL SULFATE 0.83 MG/ML
2.5 SOLUTION RESPIRATORY (INHALATION) EVERY 4 HOURS PRN
Status: DISCONTINUED | OUTPATIENT
Start: 2023-02-06 | End: 2023-02-06 | Stop reason: HOSPADM

## 2023-02-06 RX ORDER — ONDANSETRON 4 MG/1
4 TABLET, ORALLY DISINTEGRATING ORAL EVERY 6 HOURS PRN
Status: DISCONTINUED | OUTPATIENT
Start: 2023-02-06 | End: 2023-02-07 | Stop reason: HOSPADM

## 2023-02-06 RX ORDER — HALOPERIDOL 5 MG/ML
1 INJECTION INTRAMUSCULAR
Status: DISCONTINUED | OUTPATIENT
Start: 2023-02-06 | End: 2023-02-06 | Stop reason: HOSPADM

## 2023-02-06 RX ORDER — VITAMIN B COMPLEX
25 TABLET ORAL EVERY MORNING
Status: DISCONTINUED | OUTPATIENT
Start: 2023-02-07 | End: 2023-02-07 | Stop reason: HOSPADM

## 2023-02-06 RX ORDER — MULTIPLE VITAMINS W/ MINERALS TAB 9MG-400MCG
1 TAB ORAL EVERY MORNING
Status: DISCONTINUED | OUTPATIENT
Start: 2023-02-07 | End: 2023-02-07 | Stop reason: HOSPADM

## 2023-02-06 RX ORDER — CETIRIZINE HYDROCHLORIDE 10 MG/1
10 TABLET ORAL DAILY PRN
Status: DISCONTINUED | OUTPATIENT
Start: 2023-02-06 | End: 2023-02-07 | Stop reason: HOSPADM

## 2023-02-06 RX ORDER — KETAMINE HYDROCHLORIDE 10 MG/ML
INJECTION INTRAMUSCULAR; INTRAVENOUS PRN
Status: DISCONTINUED | OUTPATIENT
Start: 2023-02-06 | End: 2023-02-06

## 2023-02-06 RX ORDER — FENTANYL CITRATE-0.9 % NACL/PF 10 MCG/ML
PLASTIC BAG, INJECTION (ML) INTRAVENOUS PRN
Status: DISCONTINUED | OUTPATIENT
Start: 2023-02-06 | End: 2023-02-06

## 2023-02-06 RX ORDER — OXYCODONE HYDROCHLORIDE 5 MG/1
10 TABLET ORAL EVERY 4 HOURS PRN
Status: DISCONTINUED | OUTPATIENT
Start: 2023-02-06 | End: 2023-02-07 | Stop reason: HOSPADM

## 2023-02-06 RX ORDER — MAGNESIUM OXIDE 400 MG/1
400 TABLET ORAL EVERY MORNING
Status: DISCONTINUED | OUTPATIENT
Start: 2023-02-07 | End: 2023-02-07 | Stop reason: HOSPADM

## 2023-02-06 RX ADMIN — Medication 20 MG: at 12:36

## 2023-02-06 RX ADMIN — ACETAMINOPHEN 975 MG: 325 TABLET, FILM COATED ORAL at 17:02

## 2023-02-06 RX ADMIN — Medication 5 MG: at 12:56

## 2023-02-06 RX ADMIN — METFORMIN HYDROCHLORIDE 1000 MG: 500 TABLET, FILM COATED ORAL at 17:59

## 2023-02-06 RX ADMIN — FENTANYL CITRATE 50 MCG: 50 INJECTION, SOLUTION INTRAMUSCULAR; INTRAVENOUS at 15:16

## 2023-02-06 RX ADMIN — PROPOFOL 30 MCG/KG/MIN: 10 INJECTION, EMULSION INTRAVENOUS at 12:56

## 2023-02-06 RX ADMIN — SENNOSIDES AND DOCUSATE SODIUM 1 TABLET: 50; 8.6 TABLET ORAL at 20:19

## 2023-02-06 RX ADMIN — Medication 2.5 MG: at 13:23

## 2023-02-06 RX ADMIN — FENTANYL CITRATE 25 MCG: 50 INJECTION, SOLUTION INTRAMUSCULAR; INTRAVENOUS at 13:17

## 2023-02-06 RX ADMIN — SODIUM CHLORIDE, POTASSIUM CHLORIDE, SODIUM LACTATE AND CALCIUM CHLORIDE: 600; 310; 30; 20 INJECTION, SOLUTION INTRAVENOUS at 16:05

## 2023-02-06 RX ADMIN — Medication 100 MG: at 12:36

## 2023-02-06 RX ADMIN — HYDRALAZINE HYDROCHLORIDE 5 MG: 20 INJECTION INTRAMUSCULAR; INTRAVENOUS at 16:15

## 2023-02-06 RX ADMIN — OXYCODONE HYDROCHLORIDE 5 MG: 5 TABLET ORAL at 20:19

## 2023-02-06 RX ADMIN — BUPIVACAINE HYDROCHLORIDE 10 ML: 2.5 INJECTION, SOLUTION EPIDURAL; INFILTRATION; INTRACAUDAL at 12:00

## 2023-02-06 RX ADMIN — MIDAZOLAM 0.5 MG: 1 INJECTION INTRAMUSCULAR; INTRAVENOUS at 12:55

## 2023-02-06 RX ADMIN — CEFAZOLIN SODIUM 2 G: 2 INJECTION, SOLUTION INTRAVENOUS at 20:19

## 2023-02-06 RX ADMIN — SODIUM CHLORIDE, POTASSIUM CHLORIDE, SODIUM LACTATE AND CALCIUM CHLORIDE: 600; 310; 30; 20 INJECTION, SOLUTION INTRAVENOUS at 12:15

## 2023-02-06 RX ADMIN — HYDROMORPHONE HYDROCHLORIDE 0.4 MG: 1 INJECTION, SOLUTION INTRAMUSCULAR; INTRAVENOUS; SUBCUTANEOUS at 15:46

## 2023-02-06 RX ADMIN — TRANEXAMIC ACID 1 G: 10 INJECTION, SOLUTION INTRAVENOUS at 14:31

## 2023-02-06 RX ADMIN — Medication 2.5 MG: at 14:21

## 2023-02-06 RX ADMIN — HYDROMORPHONE HYDROCHLORIDE 0.4 MG: 1 INJECTION, SOLUTION INTRAMUSCULAR; INTRAVENOUS; SUBCUTANEOUS at 15:33

## 2023-02-06 RX ADMIN — Medication 50 MCG: at 13:32

## 2023-02-06 RX ADMIN — Medication 2 G: at 12:28

## 2023-02-06 RX ADMIN — Medication 50 MCG: at 13:29

## 2023-02-06 RX ADMIN — GLYCOPYRROLATE 0.2 MG: 0.2 INJECTION, SOLUTION INTRAMUSCULAR; INTRAVENOUS at 12:47

## 2023-02-06 RX ADMIN — SODIUM CHLORIDE 75 ML/HR: 9 INJECTION, SOLUTION INTRAVENOUS at 17:39

## 2023-02-06 RX ADMIN — BUPIVACAINE 10 ML: 13.3 INJECTION, SUSPENSION, LIPOSOMAL INFILTRATION at 12:00

## 2023-02-06 RX ADMIN — Medication 2.5 MG: at 14:04

## 2023-02-06 RX ADMIN — Medication 50 MCG: at 13:41

## 2023-02-06 RX ADMIN — TRANEXAMIC ACID 1 G: 10 INJECTION, SOLUTION INTRAVENOUS at 12:32

## 2023-02-06 RX ADMIN — MIDAZOLAM 1 MG: 1 INJECTION INTRAMUSCULAR; INTRAVENOUS at 12:29

## 2023-02-06 RX ADMIN — OXYCODONE HYDROCHLORIDE 5 MG: 5 TABLET ORAL at 17:02

## 2023-02-06 RX ADMIN — FENTANYL CITRATE 75 MCG: 50 INJECTION, SOLUTION INTRAMUSCULAR; INTRAVENOUS at 12:35

## 2023-02-06 RX ADMIN — BUPIVACAINE HYDROCHLORIDE 16 ML: 2.5 INJECTION, SOLUTION EPIDURAL; INFILTRATION; INTRACAUDAL at 12:00

## 2023-02-06 RX ADMIN — Medication 2.5 MG: at 13:26

## 2023-02-06 RX ADMIN — LIDOCAINE HYDROCHLORIDE 40 MG: 20 INJECTION, SOLUTION INFILTRATION; PERINEURAL at 12:35

## 2023-02-06 RX ADMIN — PROPOFOL 120 MG: 10 INJECTION, EMULSION INTRAVENOUS at 12:36

## 2023-02-06 RX ADMIN — Medication 50 MCG: at 13:53

## 2023-02-06 RX ADMIN — FENTANYL CITRATE 50 MCG: 50 INJECTION, SOLUTION INTRAMUSCULAR; INTRAVENOUS at 15:25

## 2023-02-06 RX ADMIN — SODIUM CHLORIDE, POTASSIUM CHLORIDE, SODIUM LACTATE AND CALCIUM CHLORIDE: 600; 310; 30; 20 INJECTION, SOLUTION INTRAVENOUS at 12:03

## 2023-02-06 ASSESSMENT — ACTIVITIES OF DAILY LIVING (ADL)
ADLS_ACUITY_SCORE: 20
ADLS_ACUITY_SCORE: 23
ADLS_ACUITY_SCORE: 23
ADLS_ACUITY_SCORE: 22
ADLS_ACUITY_SCORE: 22
ADLS_ACUITY_SCORE: 25
ADLS_ACUITY_SCORE: 22

## 2023-02-06 NOTE — BRIEF OP NOTE
[facility]    Brief Operative Note    Pre-operative diagnosis: Tear of left rotator cuff, unspecified tear extent, unspecified whether traumatic [M75.102]  Post-operative diagnosis Failed Left Total Shoulder Arthoplasty secondary to rotator cuff arthropathy    Procedure: Procedure(s):  Left Revision Total Shoulder Arthroplasty; Conversion to Reverse Total Shoulder Arthroplasty  Surgeon: Surgeon(s) and Role:     * Dinesh Miranda MD - Primary     * Jamari Garcia PA-C - Assisting  Anesthesia: General   Estimated Blood Loss: Less than 100 ml    Drains: None  Specimens:   ID Type Source Tests Collected by Time Destination   1 : Left shoulder tissue Tissue Shoulder, Left ANAEROBIC BACTERIAL CULTURE ROUTINE, GRAM STAIN, AEROBIC BACTERIAL CULTURE ROUTINE, SURGICAL PATHOLOGY EXAM Dinesh Miranda MD 2/6/2023  1:27 PM    A : Left shoulder fluid Wound Shoulder, Left ANAEROBIC BACTERIAL CULTURE ROUTINE, GRAM STAIN, FUNGAL OR YEAST CULTURE ROUTINE, AEROBIC BACTERIAL CULTURE ROUTINE Dinesh Miranda MD 2/6/2023  1:10 PM    B :  Tissue Shoulder, Left ANAEROBIC BACTERIAL CULTURE ROUTINE, GRAM STAIN, FUNGAL OR YEAST CULTURE ROUTINE, AEROBIC BACTERIAL CULTURE ROUTINE Dinesh Miranda MD 2/6/2023  1:39 PM      Findings:   None.  Complications: None.  Implants:   Implant Name Type Inv. Item Serial No.  Lot No. LRB No. Used Action   BASEPLATE-RSP 30MM W/P2 COATING - [id number] Total Joint Component/Insert Baseplate-RSP 30mm w/P2 Coating  [employer] [id number] Left 1 Implanted   Polar Stem      Left 1 Explanted   Humeral Head      Left 1 Explanted   Glenoid Baseplate      Left 1 Explanted   SCREW-LOCKING BONE RSP 5X22 - [id number]  Screw-Locking Bone RSP 5x22  [employer] [id number] Left 1 Implanted   SCREW-LOCKING BONE RSP 5X22 - [id number]  Screw-Locking Bone RSP 5x22  [employer] [id number] Left 1 Implanted   GLENOID HEAD-RSP 32MM NEUTRAL W/RETAINING SCREW - [id number]  Glenoid Head-RSP 32mm Neutral w/Retaining  Screw  [employer] [id number] Left 1 Implanted   SCREW-LOCKING BONE RSP 5X18 - [id number]  Screw-Locking Bone RSP 5x18  [employer] [id number] Left 1 Implanted   Humeral Stem, Reverse, 10 mm x 175 mm P2 Poroous Coated Total Joint Component/Insert   [employer] [id number] Left 1 Implanted   RSP Humeral Socket Insert Size 32mm Semi-Constrained +4mm Total Joint Component/Insert   [employer] [id number] Left 1 Implanted       [id number]

## 2023-02-06 NOTE — OR NURSING
Dr. villa here to see pt, will give one dose of Hydralazine to treat increased BP. Pt's pain now 1/10

## 2023-02-06 NOTE — H&P
Admitted: 02/06/2023    CHIEF COMPLAINT:  Postop left reverse total shoulder arthroplasty.    HISTORY OF PRESENTING ILLNESS:  Mr. Salazar is an 82-year-old male who underwent a revision of the left total shoulder arthroplasty with conversion to reverse total shoulder arthroplasty.  Mr. Salazar does have a history of diabetes type 2, BPH, hyperlipidemia and Wenckebach heart block.  He did undergo the procedure today without any significant complications; however, it is reported that during induction he did experience some Wenckebach block, which was brief and abated on its own.  During this episode, he was said to be a bit bradycardic in the 40s, but blood pressure remained stable.    Postoperatively, Mr. Salazar is stable; however, still is having considerable pain and at the time of this dictation, pain was rated at 7-8/10.  He denies any associated chest pain or shortness of breath.  He denies any nausea or emesis.  He has been placed on telemetry and will continue to remain on telemetry throughout this hospital stay.    The patient did have an EKG done postoperatively, which shows first-degree AV block with left axis deviation.    ALLERGIES:  MORPHINE.    PAST MEDICAL HISTORY:    1.  Type 2 diabetes mellitus.  2.  History of Wenckebach block.  3.  Tinnitus.  4.  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  5.  Hyperlipidemia.  6.  Osteoarthritis.  7.  BPH.  8.  Erectile dysfunction.  9.  Hypertension.  10.  Temporary pacemaker placement due to what is presumed complete heart block in the past.    PAST SURGICAL HISTORY:    1.  Colonoscopy.  2.  Back surgery in 2011.  3.  Neck surgery in 2011.    4.  Shoulder surgery in 2003.  Home medications,      MEDICATIONS:    1.  Allopurinol 100 mg daily.  2.  Ascorbic acid 1000 mg daily.  3.  Aspirin 81 mg daily.  4.  Zyrtec 10 mg daily.  5.  Vitamin D 1000 units daily.  6.  Trulicity 1.5 mg subcutaneously weekly.  7.  Avodart 0.5 mg every morning.  8.  Zetia 10 mg daily.  9.   Flonase 2 sprays daily.  10.  Garlic 1000 mg daily.  11.  Avapro 300 mg every morning.  12.  Magnesium 250 mg daily.  13.  Metformin 1000 mg twice daily.  14.  Multivitamin p.o. daily.  15.  Zocor 20 mg daily.  16.  Zinc sulfate 220 mg p.o. daily.  17.  Tylenol 500 mg twice daily.    SOCIAL HISTORY:  The patient lives in [facility] with his wife.  He does not have any history of smoking.  Occasional alcohol use is reported.    FAMILY HISTORY:  Remarkable for father being  with history of cancer and COPD.  Mother had a history of lung cancer and is .    REVIEW OF SYSTEMS:  A 10-point review of systems was obtained.  Pertinent positives are included in HPI.  All other systems reviewed and found to be negative.    PHYSICAL EXAMINATION:    VITAL SIGNS:  Blood pressure is currently 152/102, heart rate 86, respirations 8, O2 sat 98% on 2 liters nasal cannula, temperature is 97.2.  GENERAL:  The patient is alert and oriented, pleasant, conversant, in no acute distress per observation; however, he does complain of ongoing significant pain.  CARDIOVASCULAR:  Heart is regular rate and rhythm with a soft systolic murmur appreciated.  LUNGS:  Clear to auscultation.  ABDOMEN:  Soft with positive bowel sounds.  EXTREMITIES:  Lower extremities without edema.  Left upper extremity rigid sling.  NEUROLOGIC:  No focal or lateralizing deficits.    LABS/TESTS:    Most recent A1c per preop evaluation was 6.6.  EKG done postoperatively shows first-degree AV block with left axis deviation.    ASSESSMENT AND PLAN:  Mr. Salazar is an 82-year-old male who underwent left reverse total shoulder arthroplasty today.  During the induction phase, he is noted to go into a Wenckebach rhythm; however, that is also noted per his history.  This arrhythmia was abated on this for the arrhythmia abated on its own without any intervention.  However, he was noted to be slightly bradycardic, but no hypotension was reported.    Clinically  "Significant Risk Factors Present on Admission                    # Hypertension: home medication list includes antihypertensive(s)   # Acute Respiratory Failure: Documented O2 saturation < 91%.  Continue supplemental oxygen as needed     # Obesity: Estimated body mass index is 33.67 kg/m  as calculated from the following:    Height as of this encounter: 1.753 m (5' 9\").    Weight as of this encounter: 103.4 kg (228 lb).            1.  Orthopedic:  The patient will remain hospitalized overnight for pain management.  Physical therapy has been requested for tomorrow.  Followup post-procedure per orthopedics.  2.  Cardiac:  The patient's cardiac status appears stable at this time.  EKG shows first-degree AV block with left axis deviation:  Mr. Mayen did experience some Mobitz type 1 (Wenckebach) arrhythmia during induction phase, which he has noted per history.  We will continue to monitor the patient on telemetry.  3.  Fluid, electrolytes, and nutrition.  The patient's diet will be advanced as tolerated.    CODE STATUS:  FULL.    DISPOSITION:  Likely in the next 24 hours.    Christian Howard DO        D: 2023   T: 2023   MT: [id number]    Name:     MINI MAYEN  MRN:      [id number]        Account:     [id number]   :      1940           Admitted:    2023       Document: [id number]    cc:  DO Dinesh Cornelius MD   "

## 2023-02-06 NOTE — OP NOTE
Procedure Date: 02/06/2023    PREOPERATIVE DIAGNOSIS:  Failed left total shoulder arthroplasty secondary to rotator cuff arthropathy.    POSTOPERATIVE DIAGNOSIS:  Failed left total shoulder arthroplasty secondary to rotator cuff arthropathy.    PROCEDURE:     1.  Revision left total shoulder arthroplasty with conversion to reverse total shoulder arthroplasty.  2.  Explantation of well-fixed humeral and glenoid components.    SURGEON:  Dinesh Miranda MD    ASSISTANT:  Ashvin Garcia PA-C.  A surgical assistant was necessary to position and assist with exposing the joint and assisting in closure postoperatively.  ANESTHESIA:     1.  General endotracheal anesthesia.  2.  Peripheral nerve blocks.  3.  Local periarticular injection.    FINDINGS:     1.  Torn subscapularis and supraspinatus.  2.  Anterior-superior escape, left shoulder.  3.  Satisfactory humeral component removal with minimal bone loss.    4.  Satisfactory glenoid removal with mild bone loss.  5.  Well-fixed humeral and glenoid components following reconstruction.  6.  Stable range of motion to 120 degrees of flexion and abduction, 90 degrees of external rotation, 25 degrees of internal rotation    SPECIMENS:  Fluid and tissue sent for Gram stain and routine culture.    COMPLICATIONS:  None.    ESTIMATED BLOOD LOSS:  100 mL    INDICATIONS FOR PROCEDURE:  The patient is an 82-year-old male who is several years out from a left total shoulder arthroplasty.  He had an episode where he just could not raise his arm anymore.  He had basically pseudoparalysis.  Subsequent CT scan showed rotator cuff tear.  He had arthropathy.  I discussed proceeding with a revision total shoulder arthroplasty with conversion to a reverse.  Risks and benefits were discussed.  Consent was obtained.    DESCRIPTION OF PROCEDURE:  The patient was seen in the preoperative area.  Surgical site was marked, questions answered.  He was taken to the operating room and placed under general  anesthesia and peripheral nerve blocks.  He was then placed in a beach chair position.  He did have an episode of first or second degree heart block that resolved after a few beats and medical treatment.  We chose to proceed with the surgery given that he did not have any recurrences and his pressure remained stable.      Shoulder was prepped and draped in sterile fashion with ChloraPrep.  Timeout was called to identify the correct patient, correct surgical site.  The prior deltopectoral incision was made and extended distally slightly to improve mobilization.  I then identified the deltopectoral interval.  There was abundant scar tissue and I had to mobilize the deltoid off the humerus where there was scarring as well as scarring in the rotator cuff.  The subscapularis and supraspinatus were torn.  I tenotomized the biceps tendon.  I then was able to dislocate the shoulder anteriorly and do a complete capsular release.  I removed the humeral head and then used flexible and rigid osteotomes proximally to mobilize it.  I was able to remove the humeral component without significant bone loss without difficulty.  I then exposed the glenoid and the glenoid baseplate was removed.  There remaining these 3 metallic pegs in the holes.  They were somewhat difficult to remove.  I did remove them with an osteotome and a rongeur.  There was some mild to moderate bone loss around these PEG sites.  I then drilled bicortically with a guide pin and then reamed the glenoid down to a symmetric border.  I then tapped the hole and placed the standard glenoid baseplate.  I positioned the hole.  It had excellent purchase of the baseplate.  I positioned the holes away from the prior pegs.  I was able to place 3 locking screws without difficulty.  All had excellent purchase and length.  The final 32 neutral glenosphere was placed.  Following this, I exposed the humerus again.  I opened the canal and reamed up to a size 10.  I then broached  to a size 10 and placed a size 10 Donjoy humeral insert.  I trialed with a 32+4 semiconstrained liner and found it to have satisfactory alignment and positioning.  This implant was chosen.  The shoulder was reduced.  Copious IrriSept and pulse lavage irrigation were utilized.  I closed the deltopectoral interval with a running #1 Vicryl suture, 2-0 Vicryls and staples in the skin.  A sterile Aquacel dressing was applied.  He was awakened and transferred to PACU in stable condition.    POSTOPERATIVE PLAN:  He will be in a sling at all times.  We will initiate early range of motion of his shoulder active assistively and passively and progressing slowly as tolerated.  He will wean out of the sling at 3-4 weeks as he has improved shoulder control.  Anticipate discharge home tomorrow with pain control.    IMPLANTS:     1.  Donjoy size 10 long humeral stem.  2.  A 32 neutral glenosphere.  3.  Standard glenosphere baseplate.  4.  Three locking screws.    5.  A 32+4 semiconstrained polyethylene insert.    Dinesh Miranda MD        D: 2023   T: 2023   MT: [email or web address]    Name:     MINI MAYENSarabjit  MRN:      [id number]        Account:        [id number]   :      1940           Procedure Date: 2023     Document: [id number]

## 2023-02-06 NOTE — ANESTHESIA POSTPROCEDURE EVALUATION
Patient: Ramón Salazar    Procedure: Procedure(s):  Left Revision Total Shoulder Arthroplasty; Conversion to Reverse Total Shoulder Arthroplasty       Anesthesia Type:  General    Note:  Disposition: Inpatient   Postop Pain Control: Uneventful            Sign Out: Well controlled pain   PONV: No   Neuro/Psych: Uneventful            Sign Out: Acceptable/Baseline neuro status   Airway/Respiratory: Uneventful            Sign Out: Acceptable/Baseline resp. status   CV/Hemodynamics: Uneventful            Sign Out: Acceptable CV status; No obvious hypovolemia; No obvious fluid overload   Other NRE: NONE   DID A NON-ROUTINE EVENT OCCUR? No           Last vitals:  Vitals Value Taken Time   /96 02/06/23 1635   Temp 97.2  F (36.2  C) 02/06/23 1555   Pulse 95 02/06/23 1635   Resp 19 02/06/23 1634   SpO2 93 % 02/06/23 1634   Vitals shown include unvalidated device data.    Electronically Signed By: NORMA Sow CRNA  February 6, 2023  5:50 PM

## 2023-02-06 NOTE — ANESTHESIA PROCEDURE NOTES
Pectoralis Procedure Note    Pre-Procedure   Staff -        CRNA: Debo Morrow APRN CRNA       Other Anesthesia Staff: Pritesh Naqvi       Performed By: SRNA and with CRNAs       Procedure performed by resident/fellow/CRNA in presence of a teaching physician.         Location: pre-op       Procedure Start/Stop Times: 2/6/2023 12:00 PM and 2/6/2023 12:15 PM       Pre-Anesthestic Checklist: patient identified, IV checked, site marked, risks and benefits discussed, informed consent, monitors and equipment checked, pre-op evaluation, at physician/surgeon's request and post-op pain management  Timeout:       Correct Patient: Yes        Correct Procedure: Yes        Correct Site: Yes        Correct Position: Yes        Correct Laterality: Yes        Site Marked: Yes  Procedure Documentation  Procedure: Pectoralis             Pectoralis I and Pectoralis II       Diagnosis: LEFT TOTAL SHOULDER       Laterality: left       Patient Position: supine       Skin prep: Chloraprep       Needle Type: insulated       Needle Gauge: 20.        Needle Length (Inches): 4        Ultrasound guided       1. Ultrasound was used to identify targeted nerve, plexus, vascular marker, or fascial plane and place a needle adjacent to it in real-time.       2. Ultrasound was used to visualize the spread of anesthetic in close proximity to the above referenced structure.       3. A permanent image is entered into the patient's record.       4. The visualized anatomic structures appeared normal.       5. There were no apparent abnormal pathologic findings.    Assessment/Narrative         The placement was negative for: blood aspirated, painful injection and site bleeding       Paresthesias: No.       Bolus given via needle. no blood aspirated via catheter.        Secured via.        Insertion/Infusion Method: Single Shot       Complications: none       Injection made incrementally with aspirations every 0.5 mL.    Medication(s) Administered  "  Bupivacaine 0.25% PF (Infiltration) - Infiltration   16 mL - 2/6/2023 12:00:00 PM  Medication Administration Time: 2/6/2023 12:00 PM     Comments:  8 ml placed in each plane      FOR [facility] ([facility]) ONLY:   Pain Team Contact information: please page the Pain Team Via [employer]. Search \"Pain\". During daytime hours, please page the attending first. At night please page the resident first.    "

## 2023-02-06 NOTE — ANESTHESIA CARE TRANSFER NOTE
Patient: Ramón Salazar    Procedure: Procedure(s):  Left Revision Total Shoulder Arthroplasty; Conversion to Reverse Total Shoulder Arthroplasty       Diagnosis: Tear of left rotator cuff, unspecified tear extent, unspecified whether traumatic [M75.102]  Diagnosis Additional Information: No value filed.    Anesthesia Type:   General     Note:    Oropharynx: spontaneously breathing  Level of Consciousness: drowsy  Oxygen Supplementation: nasal cannula  Level of Supplemental Oxygen (L/min / FiO2): 2  Independent Airway: airway patency satisfactory and stable  Dentition: dentition unchanged  Vital Signs Stable: post-procedure vital signs reviewed and stable  Report to RN Given: handoff report given  Patient transferred to: PACU    Handoff Report: Identifed the Patient, Identified the Reponsible Provider, Reviewed the pertinent medical history, Discussed the surgical course, Reviewed Intra-OP anesthesia mangement and issues during anesthesia, Set expectations for post-procedure period and Allowed opportunity for questions and acknowledgement of understanding      Vitals:  Vitals Value Taken Time   /95 02/06/23 1458   Temp     Pulse 88 02/06/23 1501   Resp 10 02/06/23 1501   SpO2 96 % 02/06/23 1501   Vitals shown include unvalidated device data.    Electronically Signed By: NORMA De La Garza CRNA  February 6, 2023  3:02 PM

## 2023-02-06 NOTE — INTERVAL H&P NOTE
"I have reviewed the surgical (or preoperative) H&P that is linked to this encounter, and examined the patient. There are no significant changes    Clinical Conditions Present on Arrival:  Clinically Significant Risk Factors Present on Admission                    # Obesity: Estimated body mass index is 33.67 kg/m  as calculated from the following:    Height as of this encounter: 1.753 m (5' 9\").    Weight as of this encounter: 103.4 kg (228 lb).       "

## 2023-02-06 NOTE — OR NURSING
Patient transferred to 3210 via cart. Zelda score 10/10. Pain level 2/10.. Hand off report given to BRINDA Young.

## 2023-02-06 NOTE — OR NURSING
Pt very calm, resting, but when awakens states pain 15/10, Fentanyl and dilaudid given, pt states it is kicking in now but pain is still 10/10.  Pt very calm resting,

## 2023-02-06 NOTE — ANESTHESIA PROCEDURE NOTES
Brachial plexus Procedure Note    Pre-Procedure   Staff -        CRNA: Debo Morrow APRN CRNA       Other Anesthesia Staff: Pritesh Naqvi       Performed By: SRNA and with CRNAs       Procedure performed by resident/fellow/CRNA in presence of a teaching physician.         Location: pre-op       Procedure Start/Stop Times: 2/6/2023 12:00 PM and 2/6/2023 12:15 PM       Pre-Anesthestic Checklist: patient identified, IV checked, site marked, risks and benefits discussed, informed consent, monitors and equipment checked, pre-op evaluation, at physician/surgeon's request and post-op pain management  Timeout:       Correct Patient: Yes        Correct Procedure: Yes        Correct Site: Yes        Correct Position: Yes        Correct Laterality: Yes        Site Marked: Yes  Procedure Documentation  Procedure: Brachial plexus       Diagnosis: LEFT TOTAL SHOULDER       Laterality: left       Patient Position: supine       Skin prep: Chloraprep (interscalene approach).       Needle Type: insulated       Needle Gauge: 20.        Needle Length (Inches): 4        Ultrasound guided       1. Ultrasound was used to identify targeted nerve, plexus, vascular marker, or fascial plane and place a needle adjacent to it in real-time.       2. Ultrasound was used to visualize the spread of anesthetic in close proximity to the above referenced structure.       3. A permanent image is entered into the patient's record.       4. The visualized anatomic structures appeared normal.       5. There were no apparent abnormal pathologic findings.    Assessment/Narrative         The placement was negative for: blood aspirated, painful injection and site bleeding       Paresthesias: No.       Bolus given via needle. no blood aspirated via catheter.        Secured via.        Insertion/Infusion Method: Single Shot       Complications: none       Injection made incrementally with aspirations every 0.5 mL.    Medication(s) Administered   Bupivacaine  "0.25% PF (Infiltration) - Infiltration   10 mL - 2/6/2023 12:00:00 PM  Bupivacaine liposome (Exparel) 1.3% LA inj susp (Infiltration) - Infiltration   10 mL - 2/6/2023 12:00:00 PM  Medication Administration Time: 2/6/2023 12:00 PM      FOR [facility] ([facility]) ONLY:   Pain Team Contact information: please page the Pain Team Via [employer]. Search \"Pain\". During daytime hours, please page the attending first. At night please page the resident first.    "

## 2023-02-07 ENCOUNTER — APPOINTMENT (OUTPATIENT)
Dept: OCCUPATIONAL THERAPY | Facility: HOSPITAL | Age: 83
DRG: 483 | End: 2023-02-07
Attending: PHYSICIAN ASSISTANT
Payer: MEDICARE

## 2023-02-07 VITALS
TEMPERATURE: 98.9 F | OXYGEN SATURATION: 93 % | SYSTOLIC BLOOD PRESSURE: 133 MMHG | HEIGHT: 69 IN | WEIGHT: 228 LBS | HEART RATE: 121 BPM | DIASTOLIC BLOOD PRESSURE: 83 MMHG | RESPIRATION RATE: 16 BRPM | BODY MASS INDEX: 33.77 KG/M2

## 2023-02-07 LAB
GLUCOSE SERPL-MCNC: 162 MG/DL (ref 70–99)
HGB BLD-MCNC: 14.1 G/DL (ref 13.3–17.7)

## 2023-02-07 PROCEDURE — 36415 COLL VENOUS BLD VENIPUNCTURE: CPT | Performed by: ORTHOPAEDIC SURGERY

## 2023-02-07 PROCEDURE — 82947 ASSAY GLUCOSE BLOOD QUANT: CPT | Performed by: ORTHOPAEDIC SURGERY

## 2023-02-07 PROCEDURE — 97165 OT EVAL LOW COMPLEX 30 MIN: CPT | Mod: GO

## 2023-02-07 PROCEDURE — 0RRK00Z REPLACEMENT OF LEFT SHOULDER JOINT WITH REVERSE BALL AND SOCKET SYNTHETIC SUBSTITUTE, OPEN APPROACH: ICD-10-PCS | Performed by: ORTHOPAEDIC SURGERY

## 2023-02-07 PROCEDURE — 250N000013 HC RX MED GY IP 250 OP 250 PS 637: Performed by: PHYSICIAN ASSISTANT

## 2023-02-07 PROCEDURE — 97530 THERAPEUTIC ACTIVITIES: CPT | Mod: GO

## 2023-02-07 PROCEDURE — 85018 HEMOGLOBIN: CPT | Performed by: PHYSICIAN ASSISTANT

## 2023-02-07 PROCEDURE — 250N000011 HC RX IP 250 OP 636: Performed by: PHYSICIAN ASSISTANT

## 2023-02-07 PROCEDURE — 999N000157 HC STATISTIC RCP TIME EA 10 MIN

## 2023-02-07 RX ORDER — CEFADROXIL 500 MG/1
500 CAPSULE ORAL 2 TIMES DAILY
Qty: 14 CAPSULE | Refills: 0 | Status: SHIPPED | OUTPATIENT
Start: 2023-02-07 | End: 2023-02-14

## 2023-02-07 RX ADMIN — POLYETHYLENE GLYCOL 3350 17 G: 17 POWDER, FOR SOLUTION ORAL at 08:11

## 2023-02-07 RX ADMIN — MULTIPLE VITAMINS W/ MINERALS TAB 1 TABLET: TAB at 08:10

## 2023-02-07 RX ADMIN — ALLOPURINOL 100 MG: 100 TABLET ORAL at 08:11

## 2023-02-07 RX ADMIN — ACETAMINOPHEN 975 MG: 325 TABLET, FILM COATED ORAL at 00:55

## 2023-02-07 RX ADMIN — OXYCODONE HYDROCHLORIDE 10 MG: 5 TABLET ORAL at 00:56

## 2023-02-07 RX ADMIN — MAGNESIUM OXIDE TAB 400 MG (241.3 MG ELEMENTAL MG) 400 MG: 400 (241.3 MG) TAB at 08:09

## 2023-02-07 RX ADMIN — CEFAZOLIN SODIUM 2 G: 2 INJECTION, SOLUTION INTRAVENOUS at 04:33

## 2023-02-07 RX ADMIN — EZETIMIBE 10 MG: 10 TABLET ORAL at 08:08

## 2023-02-07 RX ADMIN — ASPIRIN 81 MG: 81 TABLET, COATED ORAL at 08:07

## 2023-02-07 RX ADMIN — DUTASTERIDE 0.5 MG: 0.5 CAPSULE, LIQUID FILLED ORAL at 08:22

## 2023-02-07 RX ADMIN — SENNOSIDES AND DOCUSATE SODIUM 1 TABLET: 50; 8.6 TABLET ORAL at 08:11

## 2023-02-07 RX ADMIN — ACETAMINOPHEN 975 MG: 325 TABLET, FILM COATED ORAL at 08:09

## 2023-02-07 RX ADMIN — Medication 25 MCG: at 08:10

## 2023-02-07 RX ADMIN — METFORMIN HYDROCHLORIDE 1000 MG: 500 TABLET, FILM COATED ORAL at 08:08

## 2023-02-07 RX ADMIN — Medication 1000 MG: at 08:23

## 2023-02-07 RX ADMIN — IRBESARTAN 300 MG: 150 TABLET ORAL at 08:06

## 2023-02-07 ASSESSMENT — ACTIVITIES OF DAILY LIVING (ADL)
ADLS_ACUITY_SCORE: 25

## 2023-02-07 NOTE — DISCHARGE SUMMARY
Admit Date: 02/06/2023  Discharge Date: 2/7/23    ADMISSION DIAGNOSIS:  Left reverse total shoulder arthroplasty.    DISCHARGE DIAGNOSES:    1.  Status post left reverse total shoulder arthroplasty.  2.  History of type 2 diabetes mellitus.  3.  History of Wenckebach block.  4.  History of hypertension.  5.  History of hyperlipidemia.  6.  History of benign prostate hypertrophy.    PENDING TEST RESULTS AT TIME OF DISCHARGE:  None.    DISPOSITION:  To home, self-care.    PROCEDURES/CONSULTATIONS:  Left reverse total shoulder arthroplasty.    HISTORY OF PRESENT ILLNESS:  Please see admission H and P.    PAST MEDICAL HISTORY:  Please see admission H and P.    HOSPITAL COURSE:  Mr. Salazar is an 82-year-old male who underwent revision of left total shoulder with reverse total shoulder arthroplasty yesterday.  There were no complications with this procedure.  The patient was noted to go into a Wenckebach pattern early in induction; however, that spontaneously abated on its own.  The patient was subsequently brought to the medical floor for observation overnight.    Overnight, the patient remained stable.  There were no acute issues.  He was seen by Orthopedics and will be discharged home with outpatient followup.    DISCHARGE PHYSICAL EXAMINATION:    VITAL SIGNS:  Blood pressure is 133/83, heart rate 104, temperature 98.9, respirations 16, O2 sat 93% on room air.  GENERAL:  The patient is alert and oriented, in no acute distress.  HEART:  Regular rate and rhythm.  S1, S2.  No murmurs, rubs, or gallops.  LUNGS:  Clear to auscultation.  ABDOMEN:  Soft with positive bowel sounds.  EXTREMITIES:  The patient's left upper extremity is in a rigid sling.  NEUROLOGIC:  No focal deficits noted.    DISCHARGE MEDICATIONS:  No changes were made to his medications at time of discharge, except for oxycodone 1-2 tablets every 4 hours as needed for pain, 30 were dispensed.    ADDITIONAL DISCHARGE INSTRUCTIONS:  CODE STATUS:  FULL.  DIET:   ADA.   ACTIVITY:  As tolerated.  Physical Therapy referral has been placed.    FOLLOWUP APPOINTMENTS AND REFERRALS:  The patient should follow up with Orthopedics as scheduled.    Christian Howard DO        D: 2023   T: 2023   MT: [id number]    Name:     MINI AMYEN  MRN:      [id number]        Account:      [id number]   :      1940           Service Date: 2023       Document: [id number]    cc:  Dwight Bliss DO

## 2023-02-07 NOTE — PLAN OF CARE
Patient discharged at 10:55 AM via wheel chair accompanied by spouse and staff. Prescriptions sent to patients preferred pharmacy. All belongings sent with patient. YES    Discharge instructions reviewed with patient and spouse. Listed belongings gathered and returned to patient. YES    Patient discharged to Home.       Surgical Patient   Surgical Procedures during stay: YES  Did patient receive discharge instruction on wound care and recognition of infection symptoms? Yes    MISC  Follow up appointment made:  Yes  Home medications returned to patient: N/A  Patient reports pain was well managed at discharge: Yes

## 2023-02-07 NOTE — DISCHARGE SUMMARY
Date of Admission: 2/6/23    Date of Discharge: 2/7/23    Admitting Physician: Dinesh Miranda MD    Consultations: Hospitalist Service    Admitting Diagnosis: Failed Left Total Shoulder Arthroplasty    Discharge Diagnosis: Same    Surgical Procedures Performed: Left Revision TSA    Hospital Complications: None    Discharge Medications: No Changes to home meds; Oxycodone 5-10 mg po every 4 hours added for pain control    Discharge Disposition: Home    Discharge Diet: As at home    Discharge Activity: NWB; Sling Left Upper Extremity    Hospital Course:   The patient underwent a Left Revision TSA.  There were no intraoperative or immediate post operative complications.  Once stable in the PACU they were transferred to the hospital floor where they remained for the remainder of the hospital stay.  Pain management transitioned from IV to oral pain medications.  Physical/Occupational Therapy was consulted for early mobility and gait training as well as ADL training.   Vital signs and hemoglobin remained stable. The Hospitalist team was consulted for management of medical co-morbidities.  At time of discharge the patient was medically stable and cleared by Therapy for safe discharge home.        Follow-Up: 10-14 days in [facility]    Questions: Call [phone] or [phone]

## 2023-02-07 NOTE — PLAN OF CARE
"/84 (BP Location: Right arm, Patient Position: Supine, Cuff Size: Adult Regular)   Pulse 104   Temp 98.8  F (37.1  C) (Tympanic)   Resp 16   Ht 1.753 m (5' 9\")   Wt 103.4 kg (228 lb)   SpO2 93%   BMI 33.67 kg/m      VSS, alert and pleasant in room. Aquacel in place w/o drainage - CDI, and arm in sling all night. Ancef given per orders. IV flushes beautifully. On RA, denies pain on final assessment of night. Remains on tele. PRN oxy and scheduled tylenol utilized when in pain. SCDs on, call light in reach, and bed in low position. Patient remained free from injury this shift.     Face to face report given with opportunity to observe patient.    Report given to Karla Coy RN   2/7/2023  7:09 AM                          "

## 2023-02-07 NOTE — PROGRESS NOTES
[facility] Inpatient Admission Note:    Patient admitted to 3210/3210-1 at approximately 1639 via bed accompanied by significant other from surgery . Report received from Viridiana PARRY in SBAR format via face to face in room. Patient is alert and oriented X 3, reports pain; rates at 2 on 0-10 scale.  Patient oriented to room, unit, hourly rounding, and plan of care. Explained admission packet and patient handbook with patient bill of rights brochure. Will continue to monitor and document as needed.     Inpatient Nursing criteria listed below was met:    Health care directives status obtained and documented: patient and wife state no advaned directives.    Patient identifies a surrogate decision maker: Yes If yes, who: his wife, Leanna. Contact Information: see [employer] board or epic for contact number.     If initial lactic acid greater than 2.0, repeat lactic acid drawn within one hour of arrival to unit: NA.    Clergy visit ordered if patient requests: declines.    Skin issues/needs documented: full assessment deferred to night shift.    Isolation Patient: no     Fall Prevention Yes: Care plan updated, education given and documented, sticker and magnet in place: Yes    Care Plan initiated: Yes    Education Documented (including assessment): Yes    Patient has discharge needs : to control pain.

## 2023-02-07 NOTE — DISCHARGE INSTRUCTIONS
Follow up appointment is scheduled for Tuesday February 21st at 10:45 AM at [facility] in [place]. If you need to change this appointment please call [facility] at [phone].

## 2023-02-07 NOTE — PLAN OF CARE
Occupational Therapy Discharge Summary    Reason for therapy discharge:    Discharged to home with assistance and OP therapy for post op rehab    Progress towards therapy goal(s). See goals on Care Plan in [facility] electronic health record for goal details.  Goals partially met.  Barriers to achieving goals:   discharge on same date as initial evaluation.    Therapy recommendation(s):    Continued therapy is recommended.  Rationale/Recommendations:  OP therapy recommended for revision of L TSA with conversion to reverse TSA.

## 2023-02-07 NOTE — PROGRESS NOTES
"   02/07/23 0900   Appointment Info   Signing Clinician's Name / Credentials (OT) Chary Lagos, OTR/L   Living Environment   People in Home spouse   Current Living Arrangements house   Home Accessibility stairs within home   Number of Stairs, Within Home, Primary greater than 10 stairs   Stair Railings, Within Home, Primary railing on left side (ascending)   Transportation Anticipated family or friend will provide   Living Environment Comments Pt's home is 1 level + basement. Laundry is in the basement as well as hobbies of pt. Pt uses a walk in shower with a shower stool if needed for sitting (he can stand). He stated he is going to add grab bars. There is a vanity on one side of the taller toilet, the tub on the other, and a towel rack in front of the toilet that pt uses for transferring.   Self-Care   Usual Activity Tolerance moderate   Current Activity Tolerance fair   Equipment Currently Used at Home other (see comments)  (3 pronged cane, shower stool if needed)   Fall history within last six months yes   Number of times patient has fallen within last six months 1   Activity/Exercise/Self-Care Comment Pt reports his spouse assists with bathing (washing his back) and UB dressing d/t his shoulders. She can assist with LB dressing, too. Pt is independent with toileting. He uses a 3 pronged cane for functional mobility and discussed baseline balance issues and falls 2x/year.   Instrumental Activities of Daily Living (IADL)   Previous Responsibilities meal prep;laundry;shopping;driving;yardwork;medication management   IADL Comments Spouse and pt both complete the cooking, laundry, shopping, and driving. She does the housekeeping. Pt stated she \"takes care of him\" and can assist as he recovers. Family can assist with yard work.   General Information   Onset of Illness/Injury or Date of Surgery 02/06/23   Referring Physician Jamari Garcia PA-C   Patient/Family Therapy Goal Statement (OT) Return home and do OP PT at " [facility]   Additional Occupational Profile Info/Pertinent History of Current Problem Pt day 1 s/p revision of L TSA with conversion to reverse TSA.   Cognitive Status Examination   Orientation Status orientation to person, place and time   Affect/Mental Status (Cognitive) WNL   Follows Commands WNL   Visual Perception   Visual Impairment/Limitations WFL   Pain Assessment   Patient Currently in Pain No   Range of Motion Comprehensive   Comment, General Range of Motion R UE WFL, L fingers/wrist WFL   Strength Comprehensive (MMT)   Comment, General Manual Muscle Testing (MMT) Assessment R UE WFL, L UE deferred d/t procedure   Muscle Tone Assessment   Muscle Tone Quick Adds No deficits were identified   Bed Mobility   Bed Mobility supine-sit   Supine-Sit [place] (Bed Mobility) minimum assist (75% patient effort);moderate assist (50% patient effort)   Comment (Bed Mobility) pt stated he needs assist and his wife can assist at home   Transfers   Transfers sit-stand transfer;toilet transfer   Sit-Stand Transfer   Sit-Stand [place] (Transfers) supervision;contact guard   Assistive Device (Sit-Stand Transfers) other (see comments)  (3 prong cane)   Toilet Transfer   Type (Toilet Transfer) sit-stand;stand-sit   [place] Level (Toilet Transfer) supervision;contact guard   Assistive Device (Toilet Transfer) grab bars/safety frame   Balance   Balance Comments fair with support   Activities of Daily Living   BADL Assessment/Intervention lower body dressing;grooming   Lower Body Dressing Assessment/Training   Position (Lower Body Dressing) unsupported sitting   [place] Level (Lower Body Dressing) maximum assist (25% patient effort)   Grooming Assessment/Training   Position (Grooming) unsupported standing   [place] Level (Grooming) supervision   Clinical Impression   Criteria for Skilled Therapeutic Interventions Met (OT) Yes, treatment indicated   OT Diagnosis Impaired ADLs   OT Problem  List-Impairments impacting ADL problems related to;activity tolerance impaired;post-surgical precautions   Assessment of Occupational Performance 1-3 Performance Deficits   Identified Performance Deficits ADLs, IADLs   Planned Therapy Interventions (OT) ADL retraining;IADL retraining;risk factor education;progressive activity/exercise;home program guidelines;ROM   Clinical Decision Making Complexity (OT) low complexity   Risk & Benefits of therapy have been explained evaluation/treatment results reviewed;care plan/treatment goals reviewed;risks/benefits reviewed;current/potential barriers reviewed;participants included;participants voiced agreement with care plan;patient   Clinical Impression Comments OT evaluation completed. Pt is doing fairly well with ADLs, completing likely close to his baseline/slightly more difficulties due to L UE procedure. Pt has good support from his wife though to assist with ADLs if needed and IADLs. No significant concerns noted with his home accessibility. Pt safe to return home when medically cleared. He will be doing OP PT for his post op therapy. Pt is suppose to D/C home today.   OT Total Evaluation Time   OT Eval, Low Complexity Minutes (57485) 15   OT Goals   Therapy Frequency (OT) 5 times/wk   OT Predicted Duration/Target Date for Goal Attainment 02/10/23   OT Goals Hygiene/Grooming;Toilet Transfer/Toileting;Aerobic Activity   OT: Hygiene/Grooming modified independent   OT: Toilet Transfer/Toileting Modified independent   OT: Perform aerobic activity with stable cardiovascular response 15 minutes   Interventions   Interventions Quick Adds Therapeutic Activity   Therapeutic Activities   Therapeutic Activity Minutes (08402) 10   Symptoms noted during/after treatment fatigue   Treatment Detail/Skilled Intervention Pt ambulated to/from the BR using his cane with SBA-CGA. He transferred a second time from the EOB with SBA and transferred to the chair a few ft away again using his  cane with SBA-CGA. Pt had a slight LOB when going to sit stating that is his norm and what happens when he falls at home. Pt educated in post op precautions and how to manage his sling. Pt was left resting in his chair with the call light within reach. His RN was notified.   OT Discharge Planning   OT Plan Progress ADLs and ongoing education/instruciton in home program if pt does not D/C today   OT Discharge Recommendation (DC Rec) home with assist  (and OP therapy for post op rehab)   OT Rationale for DC Rec OT evaluation completed. Pt is doing fairly well with ADLs, completing likely close to his baseline/slightly more difficulties due to L UE procedure. Pt has good support from his wife though to assist with ADLs if needed and IADLs. No significant concerns noted with his home accessibility. Pt safe to return home when medically cleared. He will be doing OP PT for his post op therapy. Pt is suppose to D/C home today.   OT Brief overview of current status as above   Total Session Time   Timed Code Treatment Minutes 10   Total Session Time (sum of timed and untimed services) 25

## 2023-02-07 NOTE — PROGRESS NOTES
Assessment completed by visit with Ramón.    LOC: alert, oriented, pleasant    Dx: left shoulder replacement  Chronic Disease Management: HTN, DMII, osteo, hyperlipidemia    Lives with: Leanna mckeon   Living at:  home  Transportation: YES self    Primary PCP: Dwight Bliss  Insurance:  Medicare and BCBS  Medicare IMM letter reviewed with Ramón.    Support System:  family  Homecare/PCA: not connected   /County Services:   Not connected   : NO      How was the [facility] notification completed: n/a    Health Care Directive: no  Guardian: no  POA: no    Pharmacy: [facility]   Meds management: independently manages      Adequate Resources for needs (housing, utilities, food/med): YES  Household chores: shared   Work/community/social activity: YES as desired     ADLs: independently manages  Ambulation:cane utilized   Falls: once, about 4 months ago- balance related   Nutrition: no concerns   Sleep: no concerns     Equipment used: cane, shower chair       Oxygen supplier: no      Does the supplier have valid oxygen orders: n/a    Mental health: denies   Substance abuse: denies   Exposure to violence/abuse: no concerns voiced/identified  Stressors: no concerns voiced/identified      Able to Return to Prior Living Arrangements: YES    Choice of Vendor: n/a    Barriers: no barriers identified      MARIE: low     Plan: return home via Leanna mckeon

## 2023-02-07 NOTE — PROGRESS NOTES
CMS + LUE. Pain 3/10. Ice in place. Patient would like Roxicodone 10 mg at 2100. He does state that he's comfortable at the moment. HR low 100's. Encouraging patient to deep breathe and cough. Oral intake adequate. Patient has not voided yet, DTV shortly. Alarms in place. Call light within reach. IVF reduced to 25 ml/hour for IV antibiotic through the night.     Face to face report given with opportunity to observe patient.    Report given to Solange PARRY.    Hannah Mckeon RN   2/6/2023  7:19 PM

## 2023-02-13 LAB
BACTERIA TISS BX CULT: ABNORMAL
BACTERIA TISS BX CULT: NORMAL
BACTERIA WND CULT: NO GROWTH
BACTERIA WND CULT: NORMAL

## 2023-03-07 LAB
BACTERIA TISS BX CULT: NO GROWTH
BACTERIA WND CULT: NO GROWTH

## 2024-01-23 ENCOUNTER — MEDICAL CORRESPONDENCE (OUTPATIENT)
Dept: INTERVENTIONAL RADIOLOGY/VASCULAR | Facility: HOSPITAL | Age: 84
End: 2024-01-23

## 2024-01-23 RX ORDER — CELECOXIB 100 MG/1
CAPSULE ORAL
COMMUNITY
Start: 2024-01-22

## 2024-01-23 RX ORDER — ACYCLOVIR 400 MG/1
TABLET ORAL
COMMUNITY
Start: 2023-08-09

## 2024-01-23 RX ORDER — GABAPENTIN 100 MG/1
100 CAPSULE ORAL AT BEDTIME
COMMUNITY
Start: 2023-12-11

## 2024-01-23 RX ORDER — LISINOPRIL 20 MG/1
TABLET ORAL
COMMUNITY
Start: 2024-01-22

## 2024-01-23 RX ORDER — ACYCLOVIR 400 MG/1
TABLET ORAL SEE ADMIN INSTRUCTIONS
COMMUNITY
Start: 2023-08-09

## 2024-01-26 ENCOUNTER — TELEPHONE (OUTPATIENT)
Dept: INTERVENTIONAL RADIOLOGY/VASCULAR | Facility: HOSPITAL | Age: 84
End: 2024-01-26

## 2024-01-26 NOTE — TELEPHONE ENCOUNTER
Called patient to remind them of their appt on 1/30. Also reminded patient to not take any antibiotics, steroids, or immunizations two weeks before and after this appt.  And they also need a .     LUIS LARRY

## 2024-01-30 ENCOUNTER — HOSPITAL ENCOUNTER (OUTPATIENT)
Dept: GENERAL RADIOLOGY | Facility: HOSPITAL | Age: 84
Discharge: HOME | End: 2024-01-30
Attending: FAMILY MEDICINE
Payer: MEDICARE

## 2024-01-30 ENCOUNTER — HOSPITAL ENCOUNTER (OUTPATIENT)
Facility: HOSPITAL | Age: 84
Discharge: HOME | End: 2024-01-30
Attending: RADIOLOGY | Admitting: RADIOLOGY
Payer: MEDICARE

## 2024-01-30 DIAGNOSIS — M25.552 PAIN IN LEFT HIP: ICD-10-CM

## 2024-01-30 PROCEDURE — 250N000011 HC RX IP 250 OP 636: Performed by: RADIOLOGY

## 2024-01-30 PROCEDURE — 20610 DRAIN/INJ JOINT/BURSA W/O US: CPT | Mod: LT

## 2024-01-30 PROCEDURE — 250N000009 HC RX 250: Performed by: RADIOLOGY

## 2024-01-30 PROCEDURE — 255N000002 HC RX 255 OP 636: Performed by: RADIOLOGY

## 2024-01-30 RX ORDER — LIDOCAINE HYDROCHLORIDE 10 MG/ML
10 INJECTION, SOLUTION EPIDURAL; INFILTRATION; INTRACAUDAL; PERINEURAL ONCE
Status: COMPLETED | OUTPATIENT
Start: 2024-01-30 | End: 2024-01-30

## 2024-01-30 RX ORDER — IOPAMIDOL 612 MG/ML
50 INJECTION, SOLUTION INTRAVASCULAR ONCE
Status: COMPLETED | OUTPATIENT
Start: 2024-01-30 | End: 2024-01-30

## 2024-01-30 RX ORDER — TRIAMCINOLONE ACETONIDE 40 MG/ML
40 INJECTION, SUSPENSION INTRA-ARTICULAR; INTRAMUSCULAR ONCE
Status: COMPLETED | OUTPATIENT
Start: 2024-01-30 | End: 2024-01-30

## 2024-01-30 RX ADMIN — IOPAMIDOL 3 ML: 612 INJECTION, SOLUTION INTRAVENOUS at 14:59

## 2024-01-30 RX ADMIN — LIDOCAINE HYDROCHLORIDE 5 ML: 10 INJECTION, SOLUTION EPIDURAL; INFILTRATION; INTRACAUDAL; PERINEURAL at 15:00

## 2024-01-30 RX ADMIN — TRIAMCINOLONE ACETONIDE 40 MG: 40 INJECTION, SUSPENSION INTRA-ARTICULAR; INTRAMUSCULAR at 14:59

## 2024-01-30 NOTE — DISCHARGE INSTRUCTIONS
Discharge Instructions for an Radiology Injection    Home number on file [phone] (home)  Is it ok to leave a message at this number(s) Yes    Dr. Bundy completed your procedure on 1/30/2024.    Current Pain Level (0-10 Scale): 2/10  Post Pain Level (0-10):  0/10      Activity Level:     Do not do any heavy activity or exercise for 24 hours.   Hip Injections:  Do not drive for 4 hours after your injection.  Diet:   Return to your normal diet.  Medications:   If you have stopped taking your Aspirin, Coumadin/Warfarin, Ibuprofen, or any   other blood thinner for this procedure you may resume in the morning unless   your primary care provider has given you other instructions.    Diabetics may see an increase in blood sugar after steroid injections. If you are concerned about your blood sugar, please contact your family doctor.    Site Care:  Remove the bandage and bathe or shower the morning after the procedure.      Please allow two weeks to experience improvement in your pain.  If you have any further issues, please contact your provider.  Call your Primary Care Provider if you have the following (if your primary care provider is not available please seek emergency care):   Nausea with vomiting   Severe headache   Drowsiness or confusion   Redness or drainage at the injection or puncture site   Temperature over 101 degrees F   Other concerns   Increasing joint pain

## 2025-03-24 DIAGNOSIS — R06.00 DYSPNEA, UNSPECIFIED TYPE: Primary | ICD-10-CM

## 2025-03-24 DIAGNOSIS — R06.00 DYSPNEA, UNSPECIFIED: Primary | ICD-10-CM

## 2025-03-24 DIAGNOSIS — R93.89 ABNORMAL FINDINGS ON DIAGNOSTIC IMAGING OF OTHER SPECIFIED BODY STRUCTURES: ICD-10-CM

## (undated) DEVICE — SU MONOCRYL 3-0 PS-1 27" Y936H

## (undated) DEVICE — PEN MARKING SKIN W/LABELS 31145918

## (undated) DEVICE — SLEEVE SCD EXPRESS KNEE LENGTH MED 9529

## (undated) DEVICE — DRAPE IOBAN LG .375X23.5" 6648EZ

## (undated) DEVICE — ESU ELEC BLADE 4" COATED

## (undated) DEVICE — SUCTION TUBE YANKAUR K61

## (undated) DEVICE — DRAPE SHEET REV FOLD 3/4 9349

## (undated) DEVICE — SPONGE LAP 18X18" X8435

## (undated) DEVICE — BLADE 10 RB BK SS STRL LF DISP 371210

## (undated) DEVICE — SYR BULB IRRIG DOVER 60 ML LATEX FREE 67000

## (undated) DEVICE — BLADE SAGITTAL 18MM X 90MM X 1.27MM BR4118-127-090

## (undated) DEVICE — SOL NACL 0.9% IRRIG 1000ML BOTTLE 2F7124

## (undated) DEVICE — SOL WATER IRRIG 1000ML BOTTLE 2F7114

## (undated) DEVICE — LABEL STERILE PREPRINTED FOR OR FRRH01-2M

## (undated) DEVICE — IMM KIT SHOULDER TMAX MASK FACE 7210559

## (undated) DEVICE — ADH SKIN CLOSURE PREMIERPRO EXOFIN MICOR HV 0.5ML 3471

## (undated) DEVICE — DRAPE POUCH INSTRUMENT 1018

## (undated) DEVICE — CLEANSER JET LAVAGE IRRISEPT 0.05% CHG IRRISEPT45USA

## (undated) DEVICE — WATER STERILE 1000ML STERILE UROMATIC 2B-71-14

## (undated) DEVICE — DRILL BIT-4.0MM CALIBRATED GRADUATED DEPTH MARKS

## (undated) DEVICE — PIN-BONE QUICK RELEASE 2PK

## (undated) DEVICE — DRAPE MAYO STAND 23X54 8337

## (undated) DEVICE — PREP CHLORAPREP 26ML TINTED HI-LITE ORANGE 930815

## (undated) DEVICE — GUIDE-REAMER BONE TAP 6.5MM RSP SHOULDER

## (undated) DEVICE — ESU GROUND PAD ADULT W/CORD E7507

## (undated) DEVICE — PULSE LAVAGE IRRIGATION SYSTEM 0210-114-100

## (undated) DEVICE — PACK SHOULDER ARTHROSCOPY CUSTOM SOP32SAMBD

## (undated) DEVICE — PACK BASIN SET UP SUTCNBSBBA

## (undated) DEVICE — NDL 18GA 1.5" 305196

## (undated) DEVICE — STPL SKIN 35W 6.9MM  PXW35

## (undated) DEVICE — GOWN SMARTGOWN 2XL LEVEL 4 RAGLAN 39075

## (undated) DEVICE — COVER LT HANDLE 2/PK 5160-2FG

## (undated) DEVICE — SUTURE VICRYL+ 0 27IN CP-1 UND VCP267H

## (undated) DEVICE — Device

## (undated) DEVICE — STAPLER-SKIN 35 WIDE STAPLES

## (undated) DEVICE — SU STRATAFIX PDS PLUS 2-0 SPIRAL CT-1 70CM SXPP1B412

## (undated) DEVICE — IMM SHOULDER  ABDUCT ULTRASLING III LG 11-0449-4

## (undated) DEVICE — SYR 20ML LL W/O NDL 302830

## (undated) DEVICE — SOL NACL 0.9% IRRIG 3000ML BAG 2B7477

## (undated) DEVICE — SU VICRYL 1 CT-1 27" UND J261H

## (undated) DEVICE — TRIMANO BEACH CHAIR KIT AR-1644

## (undated) DEVICE — NDL BLUNT 18GA 1.5" W/O FILTER 305180

## (undated) DEVICE — RESTRAINT EXTREMITY SONTARA LIMB HOLDER 40IN 79-91460

## (undated) DEVICE — HOOD STRYKER FOR ORTHO 0408-800-400

## (undated) RX ORDER — FENTANYL CITRATE 50 UG/ML
INJECTION, SOLUTION INTRAMUSCULAR; INTRAVENOUS
Status: DISPENSED
Start: 2023-02-06

## (undated) RX ORDER — PROPOFOL 10 MG/ML
INJECTION, EMULSION INTRAVENOUS
Status: DISPENSED
Start: 2023-02-06

## (undated) RX ORDER — EPHEDRINE SULFATE 50 MG/ML
INJECTION, SOLUTION INTRAMUSCULAR; INTRAVENOUS; SUBCUTANEOUS
Status: DISPENSED
Start: 2023-02-06